# Patient Record
Sex: MALE | Race: WHITE | NOT HISPANIC OR LATINO | Employment: OTHER | URBAN - METROPOLITAN AREA
[De-identification: names, ages, dates, MRNs, and addresses within clinical notes are randomized per-mention and may not be internally consistent; named-entity substitution may affect disease eponyms.]

---

## 2018-11-06 ENCOUNTER — OFFICE VISIT CONVERTED (OUTPATIENT)
Dept: SURGERY | Facility: CLINIC | Age: 73
End: 2018-11-06
Attending: SURGERY

## 2018-12-03 ENCOUNTER — OFFICE VISIT CONVERTED (OUTPATIENT)
Dept: ORTHOPEDIC SURGERY | Facility: CLINIC | Age: 73
End: 2018-12-03
Attending: ORTHOPAEDIC SURGERY

## 2018-12-17 ENCOUNTER — OFFICE VISIT CONVERTED (OUTPATIENT)
Dept: ORTHOPEDIC SURGERY | Facility: CLINIC | Age: 73
End: 2018-12-17
Attending: ORTHOPAEDIC SURGERY

## 2019-12-30 ENCOUNTER — HOSPITAL ENCOUNTER (OUTPATIENT)
Dept: OTHER | Facility: HOSPITAL | Age: 74
Discharge: HOME | End: 2019-12-30
Attending: FAMILY MEDICINE

## 2020-07-17 ENCOUNTER — HOSPITAL ENCOUNTER (OUTPATIENT)
Dept: FAMILY MEDICINE CLINIC | Facility: CLINIC | Age: 75
Discharge: HOME | End: 2020-07-17
Attending: INTERNAL MEDICINE

## 2020-07-17 LAB
C DIFF TOX B STL QL CT TISS CULT: NEGATIVE
CONV 027 TOXIN: NEGATIVE

## 2020-07-20 LAB — BACTERIA SPEC AEROBE CULT: NORMAL

## 2020-07-21 ENCOUNTER — OFFICE VISIT CONVERTED (OUTPATIENT)
Dept: GASTROENTEROLOGY | Facility: CLINIC | Age: 75
End: 2020-07-21
Attending: PHYSICIAN ASSISTANT

## 2020-08-21 ENCOUNTER — HOSPITAL ENCOUNTER (OUTPATIENT)
Dept: PREADMISSION TESTING | Facility: HOSPITAL | Age: 75
Discharge: HOME | End: 2020-08-21
Attending: INTERNAL MEDICINE

## 2020-08-22 LAB — SARS-COV-2 RNA SPEC QL NAA+PROBE: NOT DETECTED

## 2020-08-26 ENCOUNTER — HOSPITAL ENCOUNTER (OUTPATIENT)
Dept: GASTROENTEROLOGY | Facility: HOSPITAL | Age: 75
Setting detail: HOSPITAL OUTPATIENT SURGERY
Discharge: HOME | End: 2020-08-26
Attending: INTERNAL MEDICINE

## 2020-08-26 LAB — GLUCOSE BLD-MCNC: 111 MG/DL (ref 70–99)

## 2020-10-27 ENCOUNTER — OFFICE VISIT CONVERTED (OUTPATIENT)
Dept: GASTROENTEROLOGY | Facility: CLINIC | Age: 75
End: 2020-10-27
Attending: INTERNAL MEDICINE

## 2021-05-13 NOTE — PROGRESS NOTES
Progress Note      Patient Name: Aston Vergara   Patient ID: [id number]   Sex: Male   YOB: 1945    Primary Care Provider: Leon Vergara MD    Visit Date: October 27, 2020    Provider: Meir Carrillo MD   Location: [facility]   Location Address: [address] [place] [address]  [id number]   Location Phone: [phone]          Chief Complaint  · Follow up of Colonoscopy      History Of Present Illness     75-year-old male with a history of left-sided ulcerative colitis diagnosed in 2006.  In the past year he had discontinued all of his sulfasalazine but then started having recurrent symptoms several months ago.  We repeated his colonoscopy about 3 months ago and he had active colitis to about 25 cm confirmed on biopsies but no evidence of dysplasia.  He had restarted the sulfasalazine and currently is taking 1000 mg p.o. twice daily and his symptoms have now once again resolved.  He denies rectal bleeding, diarrhea, abdominal pain, and overall feels much improved.  He has never been exposed immunomodulators or biologic therapies.       Past Medical History  Allergic rhinitis, chronic; Arthritis; Diabetes; High blood pressure; History of knee joint replacement, left; Hypertension; Reflux; Sleep apnea; Ulcerative colitis         Past Surgical History  Artificial Joints/Limbs; Colonoscopy; EGD; Eye Implant; Joint Surgery; Knee surgery         Medication List  amlodipine 5 mg oral tablet; aspirin 81 mg oral tablet,delayed release (DR/EC); atorvastatin 10 mg oral tablet; clonazepam 1 mg oral tablet; diclofenac sodium 50 mg oral tablet,delayed release (DR/EC); glipizide 5 mg oral tablet; metformin 500 mg oral tablet; sulfasalazine 500 mg oral tablet; tamsulosin 0.4 mg oral capsule         Allergy List  NO KNOWN DRUG ALLERGIES         Family Medical History  Diabetes, unspecified type; - No Family History of Colorectal Cancer; Family history of certain chronic disabling diseases;  "arthritis         Social History  Alcohol (Current some day); Caffeine (Current every day); Claustophobic (Unknown); lives with other; Recreational Drug Use (Never); Retired.; Second hand smoke exposure (Never); Single.; Tobacco (Former)         Review of Systems  · Constitutional  o Denies  o : chills, fever  · Cardiovascular  o Denies  o : exertional chest pain  · Respiratory  o Denies  o : shortness of breath  · Gastrointestinal  o Denies  o : nausea, vomiting, dysphagia  · Endocrine  o Denies  o : weight gain, weight loss      Vitals  Date Time BP Position Site L\R Cuff Size HR RR TEMP (F) WT  HT  BMI kg/m2 BSA m2 O2 Sat FR L/min FiO2 HC       10/27/2020 12:50 /71 Sitting    60 - R 12  278lbs 2oz 5'  10\" 39.91 2.5 94 %            Physical Examination  · Constitutional  o Appearance  o : Healthy-appearing, awake and alert in no acute distress  · Head and Face  o Head  o : Normocephalic with no worriesome skin lesions  · Eyes  o Vision  o :   § Visual Fields  § : eyes move symmetrical in all directions  o Sclerae  o : sclerae anicteric  · Neck  o Inspection/Palpation  o : Trachea is midline, no adenopathy  · Respiratory  o Respiratory Effort  o : Breathing is unlabored.  o Inspection of Chest  o : normal appearance  o Auscultation of Lungs  o : Chest is clear to auscultation bilaterally.  · Cardiovascular  o Heart  o :   § Auscultation of Heart  § : no murmurs, rubs, or gallops  o Peripheral Vascular System  o :   § Extremities  § : no cyanosis, clubbing or edema;   · Gastrointestinal  o Abdominal Examination  o : Abdomen is soft, nontender to palpation, with normal active bowel sounds, no appreciable hepatosplenomegaly.              Assessment  · Colitis, Ulcerative     556.9/K51.90      Plan  · Medications  o Medications have been Reconciled  o Transition of Care or Provider Policy  · Instructions  o Overall the patient is much improved after restarting his sulfasalazine 1000 mg p.o. twice daily which we " will continue. He will likely need to continue maintenance therapy indefinitely for his left-sided ulcerative colitis. Because he is doing so well he will follow-up in 1 year or call sooner if needed.            Electronically Signed by: Meir Carrillo MD -Author on November 5, 2020 05:25:24 PM

## 2021-05-13 NOTE — PROGRESS NOTES
Progress Note      Patient Name: Aston Vergara   Patient ID: [id number]   Sex: Male   YOB: 1945    Primary Care Provider: Leon Vergara MD   Referring Provider: Leon Vergara MD    Visit Date: July 21, 2020    Provider: Giancarlo Jeffrey PA-C   Location: [facility]   Location Address: [address] [place] [address]  [id number]   Location Phone: [phone]          Chief Complaint  · Follow-up      History Of Present Illness     75-year-old male with history of ulcerative colitis since 2006 returns after long absence for recent flareup.  He was having loose bloody bowel movements for approximately 2 weeks described as 6 times per day.  He did have negative stool studies.  He resumed his sulfasalazine 1 g twice daily which she had been off of for over a year.  He resumed his sulfasalazine which has completely resolved his symptoms.  He has 1-2 formed bowel movements daily.  He is no longer having bloody stools.  He is only have lower abdominal cramping. Review of the colonoscopy/EGD by Dr. Carrillo 10/2017 showed a small size hiatal hernia, stricture in the GE junction dilated 18 mm, gastritis, and right-sided ulcerative colitis.  He has never been on immunomodulators or biological therapy per his report       Past Medical History  Allergic rhinitis, chronic; Arthritis; Diabetes; High blood pressure; History of knee joint replacement, left; Hypertension; Reflux; Sleep apnea; Ulcerative colitis         Past Surgical History  Artificial Joints/Limbs; Colonoscopy; EGD; Eye Implant; Joint Surgery; Knee surgery         Medication List  aspirin 81 mg oral tablet,delayed release (DR/EC); atorvastatin 10 mg oral tablet; clonazepam 1 mg oral tablet; glipizide 5 mg oral tablet; metformin 500 mg oral tablet; sulfasalazine 500 mg oral tablet; Suprep Bowel Prep Kit 17.5-3.13-1.6 gram oral recon soln; tamsulosin 0.4 mg oral capsule         Allergy List  NO KNOWN DRUG ALLERGIES       Allergies Reconciled  Family  "Medical History  Diabetes, unspecified type; - No Family History of Colorectal Cancer; Family history of certain chronic disabling diseases; arthritis         Social History  Alcohol (Current some day); Caffeine (Current every day); Claustophobic (Unknown); lives with other; Recreational Drug Use (Never); Retired.; Second hand smoke exposure (Never); Single.; Tobacco (Former)         Review of Systems  · Constitutional  o Denies  o : chills, fever  · Cardiovascular  o Denies  o : chest pain  · Respiratory  o Denies  o : shortness of breath  · Gastrointestinal  o Denies  o : nausea, vomiting, dysphagia  · Endocrine  o Denies  o : weight gain, weight loss      Vitals  Date Time BP Position Site L\R Cuff Size HR RR TEMP (F) WT  HT  BMI kg/m2 BSA m2 O2 Sat HC       07/21/2020 12:58 /77 Sitting    59 - R 16  269lbs 0oz 5'  10\" 38.6 2.45 98 %          Physical Examination  · Constitutional  o Appearance  o : Healthy-appearing, awake and alert in no acute distress  · Head and Face  o Head  o : Normocephalic with no worriesome skin lesions  · Eyes  o Vision  o :   § Visual Fields  § : eyes move symmetrical in all directions  o Sclerae  o : sclerae anicteric  · Neck  o Inspection/Palpation  o : Trachea is midline, no adenopathy  · Respiratory  o Respiratory Effort  o : Breathing is unlabored.  o Inspection of Chest  o : normal appearance  o Auscultation of Lungs  o : Chest is clear to auscultation bilaterally.  · Cardiovascular  o Heart  o :   § Auscultation of Heart  § : no murmurs, rubs, or gallops  o Peripheral Vascular System  o :   § Extremities  § : no cyanosis, clubbing or edema;   · Gastrointestinal  o Abdominal Examination  o : Abdomen is soft, nontender to palpation, with normal active bowel sounds, no appreciable hepatosplenomegaly.          Assessment  · Ulcerative Colitis     556.9/K51.90      Plan  · Orders  o Flexible Colonoscopy -Possible risks/complications, benefits, and alternatives to surgical or " invasive procedure have been explained to patient and/or legal gaurdian. -Patient has been evaluated and can tolerate anethesia and/or sedation. Risk, benefits, and alternatives to anethesia and/or sedation have been explained to patient and/or legal gaurdian. ([id number]) - 556.9/K51.90 - 07/21/2020  · Medications  o Medications have been Reconciled  o Transition of Care or Provider Policy  · Instructions  o 75-year-old male with a recent flare of ulcerative colitis is also due for a colonoscopy. I will schedule him for a colonoscopy to evaluate the extent of ulcerative colitis. He has never been on immunomodulator biological therapy. He will continue sulfasalazine 1 g twice daily. He will call for any worsening symptoms and I will consider a prednisone taper.            Electronically Signed by: Giancarlo Jeffrey PA-C -Author on July 21, 2020 01:15:12 PM  Electronically Co-signed by: Meir Carrillo MD -Reviewer on July 22, 2020 05:27:59 PM

## 2021-05-14 VITALS
OXYGEN SATURATION: 94 % | SYSTOLIC BLOOD PRESSURE: 152 MMHG | WEIGHT: 278.12 LBS | BODY MASS INDEX: 39.82 KG/M2 | DIASTOLIC BLOOD PRESSURE: 71 MMHG | HEIGHT: 70 IN | RESPIRATION RATE: 12 BRPM | HEART RATE: 60 BPM

## 2021-05-15 VITALS
WEIGHT: 269 LBS | DIASTOLIC BLOOD PRESSURE: 77 MMHG | RESPIRATION RATE: 16 BRPM | BODY MASS INDEX: 38.51 KG/M2 | OXYGEN SATURATION: 98 % | HEIGHT: 70 IN | HEART RATE: 59 BPM | SYSTOLIC BLOOD PRESSURE: 153 MMHG

## 2021-05-16 VITALS — OXYGEN SATURATION: 96 % | BODY MASS INDEX: 37.65 KG/M2 | HEART RATE: 62 BPM | WEIGHT: 263 LBS | HEIGHT: 70 IN

## 2021-05-16 VITALS — WEIGHT: 262.12 LBS | HEIGHT: 70 IN | RESPIRATION RATE: 16 BRPM | BODY MASS INDEX: 37.53 KG/M2

## 2021-05-16 VITALS — HEART RATE: 76 BPM | BODY MASS INDEX: 37.51 KG/M2 | WEIGHT: 262 LBS | OXYGEN SATURATION: 97 % | HEIGHT: 70 IN

## 2021-10-28 RX ORDER — ATORVASTATIN CALCIUM 10 MG/1
TABLET, FILM COATED ORAL
COMMUNITY

## 2021-10-28 RX ORDER — AMLODIPINE BESYLATE 5 MG/1
TABLET ORAL
COMMUNITY

## 2021-10-28 RX ORDER — ASPIRIN 81 MG/1
TABLET ORAL
COMMUNITY

## 2021-10-28 RX ORDER — CLONAZEPAM 1 MG/1
TABLET ORAL
COMMUNITY

## 2021-10-28 RX ORDER — ALLOPURINOL 300 MG/1
TABLET ORAL
COMMUNITY

## 2021-10-28 RX ORDER — TAMSULOSIN HYDROCHLORIDE 0.4 MG/1
CAPSULE ORAL
COMMUNITY

## 2021-10-28 RX ORDER — GLIPIZIDE 5 MG/1
TABLET ORAL
COMMUNITY

## 2021-11-02 ENCOUNTER — OFFICE VISIT (OUTPATIENT)
Dept: GASTROENTEROLOGY | Facility: CLINIC | Age: 76
End: 2021-11-02

## 2021-11-02 VITALS
WEIGHT: 289 LBS | BODY MASS INDEX: 41.47 KG/M2 | DIASTOLIC BLOOD PRESSURE: 82 MMHG | SYSTOLIC BLOOD PRESSURE: 180 MMHG | HEART RATE: 64 BPM | OXYGEN SATURATION: 95 %

## 2021-11-02 DIAGNOSIS — K62.5 RECTAL BLEEDING: ICD-10-CM

## 2021-11-02 DIAGNOSIS — K51.30 ULCERATIVE RECTOSIGMOIDITIS WITHOUT COMPLICATION (HCC): Primary | ICD-10-CM

## 2021-11-02 PROCEDURE — 99213 OFFICE O/P EST LOW 20 MIN: CPT | Performed by: INTERNAL MEDICINE

## 2021-11-02 RX ORDER — SULFASALAZINE 500 MG/1
1000 TABLET ORAL 2 TIMES DAILY
Qty: 120 TABLET | Refills: 11 | Status: SHIPPED | OUTPATIENT
Start: 2021-11-02 | End: 2021-12-02

## 2021-11-02 RX ORDER — SULFASALAZINE 500 MG/1
1000 TABLET ORAL 2 TIMES DAILY
COMMUNITY
Start: 2021-10-21 | End: 2021-11-02 | Stop reason: SDUPTHER

## 2021-11-02 NOTE — PROGRESS NOTES
"Chief Complaint    Aston Vergara is a 76 y.o. male who presents to [facility] for follow-up of left-sided ulcerative colitis to 25 cm.  His last colonoscopy was done in August 2020 and showed colitis and therefore the patient has been maintained on sulfasalazine 1000 mg / 2 tablets twice daily.  Currently is doing well denies any bleeding, diarrhea, abdominal pain, nausea vomiting.  He actually has occasional hard stools and has to use a stool softener.    Result Review :     The following data was reviewed by: Meir Carrillo MD on 11/02/2021:              Data reviewed: GI studies Colonoscopy August 2020 reviewed     Past Medical History:   Diagnosis Date   • Acid reflux    • Allergic rhinitis    • Arthritis    • Diabetes (HCC)    • High blood pressure    • History of knee joint replacement 12/18/2018    right   • Hypertension    • Sleep apnea    • Ulcerative colitis (HCC)        Past Surgical History:   Procedure Laterality Date   • COLONOSCOPY  2020   • ENDOSCOPY  2017   • INTRAOCULAR LENS INSERTION     • JOINT REPLACEMENT     • KNEE SURGERY      both knee replaced   • OTHER SURGICAL HISTORY      artificial joints/limbs       Social History     Social History Narrative   • Not on file       Objective     Vital Signs:   /82   Pulse 64   Wt 131 kg (289 lb)   SpO2 95%   BMI 41.47 kg/m²     Body mass index is 41.47 kg/m².    Physical Exam            Assessment and Plan    Diagnoses and all orders for this visit:    1. 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 Rectal bleeding    The patient continues on sulfasalazine 1000 mg p.o. twice daily and I will refill this medication.  He previously discontinued that therapy and had recurrent symptoms.  We will plan repeat colonoscopy in August 2023 or sooner if needed.  He will see me annually unless he develops any recurrent symptoms..  Patient is now known as \"pumpkin\".              Follow Up "   Return in about 1 year (around 11/2/2022).  Patient was given instructions and counseling regarding his condition or for health maintenance advice. Please see specific information pulled into the AVS if appropriate.

## 2021-12-21 RX ORDER — SULFASALAZINE 500 MG/1
TABLET ORAL
Qty: 120 TABLET | Refills: 5 | Status: SHIPPED | OUTPATIENT
Start: 2021-12-21 | End: 2022-06-20

## 2022-06-20 RX ORDER — SULFASALAZINE 500 MG/1
TABLET ORAL
Qty: 120 TABLET | Refills: 5 | Status: SHIPPED | OUTPATIENT
Start: 2022-06-20 | End: 2022-07-20

## 2022-11-08 ENCOUNTER — OFFICE VISIT (OUTPATIENT)
Dept: GASTROENTEROLOGY | Facility: CLINIC | Age: 77
End: 2022-11-08

## 2022-11-08 VITALS
WEIGHT: 282 LBS | SYSTOLIC BLOOD PRESSURE: 138 MMHG | DIASTOLIC BLOOD PRESSURE: 78 MMHG | HEART RATE: 77 BPM | BODY MASS INDEX: 40.37 KG/M2 | OXYGEN SATURATION: 94 % | HEIGHT: 70 IN

## 2022-11-08 DIAGNOSIS — K62.5 RECTAL BLEEDING: ICD-10-CM

## 2022-11-08 DIAGNOSIS — K51.30 ULCERATIVE RECTOSIGMOIDITIS WITHOUT COMPLICATION: Primary | ICD-10-CM

## 2022-11-08 PROCEDURE — 99213 OFFICE O/P EST LOW 20 MIN: CPT | Performed by: INTERNAL MEDICINE

## 2022-11-08 RX ORDER — LOSARTAN POTASSIUM AND HYDROCHLOROTHIAZIDE 12.5; 1 MG/1; MG/1
TABLET ORAL TAKE AS DIRECTED
COMMUNITY
Start: 2022-11-07

## 2022-11-08 RX ORDER — SULFASALAZINE 500 MG/1
1000 TABLET ORAL 2 TIMES DAILY
COMMUNITY
End: 2022-12-12

## 2022-11-08 NOTE — PROGRESS NOTES
"Chief Complaint    Aston Vergara is a 77 y.o. male who presents to [facility] for follow-up of rectal bleeding and ulcerative colitis of the left colon.  He currently is on sulfasalazine 1000 mg p.o. twice daily and has done well over the past year.  He denies any ongoing bleeding, diarrhea, abdominal pain, or any other GI complaints.  His last colonoscopy was August 2020 and showed colitis to about 25 cm    Result Review :     The following data was reviewed by: Meir Carrillo MD on 11/08/2022:              Data reviewed: Radiologic studies Reviewed     Past Medical History:   Diagnosis Date   • Acid reflux    • Allergic rhinitis    • Arthritis    • Diabetes (HCC)    • High blood pressure    • History of knee joint replacement 12/18/2018    right   • Hypertension    • Sleep apnea    • Ulcerative colitis (HCC)        Past Surgical History:   Procedure Laterality Date   • COLONOSCOPY  2020   • ENDOSCOPY  2017   • INTRAOCULAR LENS INSERTION     • JOINT REPLACEMENT     • KNEE SURGERY      both knee replaced   • OTHER SURGICAL HISTORY      artificial joints/limbs       Social History     Social History Narrative   • Not on file       Objective     Vital Signs:   /78 (BP Location: Right arm, Patient Position: Sitting, Cuff Size: Adult)   Pulse 77   Ht 177.8 cm (70\")   Wt 128 kg (282 lb)   SpO2 94%   BMI 40.46 kg/m²     Body mass index is 40.46 kg/m².    Physical Exam            Assessment and Plan    Diagnoses and all orders for this visit:    1. 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 Rectal bleeding      Overall the patient is doing well and will continue sulfasalazine 1000 mg p.o. twice daily.  He will follow-up with me in August 2023 and at that time be scheduled for repeat colonoscopy.  He goes by the name pumpkin and is from [place]            Follow Up   No follow-ups on file.  Patient was given instructions and counseling " regarding his condition or for health maintenance advice. Please see specific information pulled into the AVS if appropriate.

## 2022-12-12 RX ORDER — SULFASALAZINE 500 MG/1
TABLET ORAL
Qty: 120 TABLET | Refills: 5 | Status: SHIPPED | OUTPATIENT
Start: 2022-12-12

## 2023-04-25 ENCOUNTER — TELEPHONE (OUTPATIENT)
Dept: GASTROENTEROLOGY | Facility: CLINIC | Age: 78
End: 2023-04-25
Payer: MEDICARE

## 2023-04-25 NOTE — TELEPHONE ENCOUNTER
I spoke with Mr Vergara, informed him Dr Carrillo will not be available on 06.13.23 at 1:45pm. Offered to reschedule him with our NP's.  Pt agreed, new appt is 06.14.23 at 1:15pm. Voiced understanding. [email or web address]

## 2023-06-12 RX ORDER — SULFASALAZINE 500 MG/1
TABLET ORAL
Qty: 120 TABLET | Refills: 5 | Status: SHIPPED | OUTPATIENT
Start: 2023-06-12 | End: 2023-06-14 | Stop reason: SDUPTHER

## 2023-06-12 NOTE — PROGRESS NOTES
Chief Complaint   UC/Due for colonoscopy    History of Present Illness       Aston Vergara is a 77 y.o. male who presents to [facility] for follow-up of rectal bleeding and ulcerative colitis of the left colon.  He currently is on sulfasalazine 1000 mg p.o. twice daily and has done well over the past year.  He denies any ongoing bleeding, diarrhea, abdominal pain, or any other GI complaints.  His last colonoscopy was August 2020 and showed colitis to about 25 cm     Colonoscopy: Review of the patient's most recent colonoscopy performed by Dr. Carrillo on 08.26.2020 patchy discontinuous granularity and erythema with contact bleeding noted in the whole colon predominantly affecting the rectum extending to 25 cm compatible with moderate ulcerative colitis of the left colon with more questant disease seen in the right and transverse colon.  Grade 1 internal hemorrhoids.    EGD: Review of the patient's most recent EGD performed by Dr. Carrillo on 10.19.2017 small hiatal hernia, diffuse continuous erythema of the mucosa in the stomach patchy discontinuous erythema friable and ulcer with contact bleeding were noted in the ascending colon, hepatic flexure and transverse colon        Results       Result Review :   The following data was reviewed by: WILLIE Hummel on 06/14/2023:                      Past Medical History       Past Medical History:   Diagnosis Date    Acid reflux     Allergic rhinitis     Arthritis     Diabetes     High blood pressure     History of knee joint replacement 12/18/2018    right    Hypertension     Sleep apnea     Ulcerative colitis        Past Surgical History:   Procedure Laterality Date    COLONOSCOPY  2020    ENDOSCOPY  2017    INTRAOCULAR LENS INSERTION      JOINT REPLACEMENT      KNEE SURGERY      both knee replaced    OTHER SURGICAL HISTORY      artificial joints/limbs         Current Outpatient Medications:     amLODIPine (NORVASC) 10 MG tablet, Take 1  tablet by mouth., Disp: , Rfl:     aspirin 81 MG EC tablet, aspirin 81 mg oral tablet,delayed release (DR/EC) take 1 tablet (81 mg) by oral route once daily   Active, Disp: , Rfl:     atorvastatin (LIPITOR) 10 MG tablet, atorvastatin 10 mg oral tablet take 1 tablet (10 mg) by oral route once daily at bedtime   Active, Disp: , Rfl:     clonazePAM (KlonoPIN) 2 MG tablet, Take 1 tablet by mouth every night at bedtime., Disp: , Rfl:     diclofenac (VOLTAREN) 50 MG EC tablet, diclofenac sodium 50 mg oral tablet,delayed release (DR/EC) take 1 tablet (50 mg) by oral route 2 times per day   Active, Disp: , Rfl:     dutasteride (AVODART) 0.5 MG capsule, Take 1 capsule by mouth Daily., Disp: , Rfl:     fluticasone (FLONASE) 50 MCG/ACT nasal spray, USE 2 SPRAYS IN EACHN NOSTRIL TWICE DAILY, Disp: , Rfl:     glipizide (GLUCOTROL) 5 MG tablet, glipizide 5 mg oral tablet take 1 tablet (5 mg) by oral route once daily before a meal   Active, Disp: , Rfl:     losartan-hydrochlorothiazide (HYZAAR) 100-12.5 MG per tablet, Take As Directed., Disp: , Rfl:     metFORMIN (GLUCOPHAGE) 500 MG tablet, metformin 500 mg oral tablet take 1 tablet (500 mg) by oral route 2 times per day with morning and evening meals   Active, Disp: , Rfl:     sulfaSALAzine (AZULFIDINE) 500 MG tablet, Take 2 tablets by mouth 2 (Two) Times a Day., Disp: 120 tablet, Rfl: 5    tamsulosin (FLOMAX) 0.4 MG capsule 24 hr capsule, tamsulosin 0.4 mg oral capsule take 1 capsule (0.4 mg) by oral route once daily 1/2 hour following the same meal each day   Active, Disp: , Rfl:     TART CHERRY PO, Take  by mouth Daily As Needed., Disp: , Rfl:     allopurinol (ZYLOPRIM) 300 MG tablet, allopurinol 300 mg oral tablet take 1 tablet (300 mg) by oral route once daily for 90 days   Active (Patient not taking: Reported on 6/14/2023), Disp: , Rfl:      Allergies   Allergen Reactions    Colchicine Nausea Only     And diarrhea       Family History   Problem Relation Age of Onset     "Diabetes Mother     Arthritis Mother     Arthritis Father     Colon cancer Neg Hx         Social History     Social History Narrative    Not on file       Objective   Vital Signs:   /57 (BP Location: Right arm, Patient Position: Sitting, Cuff Size: Large Adult)   Pulse 83   Ht 177.8 cm (70\")   Wt 130 kg (286 lb)   SpO2 91%   BMI 41.04 kg/m²       Physical Exam  Constitutional:       General: He is not in acute distress.     Appearance: Normal appearance. He is well-developed and normal weight.   Eyes:      Conjunctiva/sclera: Conjunctivae normal.      Pupils: Pupils are equal, round, and reactive to light.      Visual Fields: Right eye visual fields normal and left eye visual fields normal.   Cardiovascular:      Rate and Rhythm: Normal rate and regular rhythm.      Heart sounds: Normal heart sounds.   Pulmonary:      Effort: Pulmonary effort is normal. No retractions.      Breath sounds: Normal breath sounds and air entry.      Comments: Inspection of chest: normal appearance  Abdominal:      General: Bowel sounds are normal.      Palpations: Abdomen is soft.      Tenderness: There is no abdominal tenderness.      Comments: No appreciable hepatosplenomegaly   Musculoskeletal:      Cervical back: Neck supple.      Right lower leg: No edema.      Left lower leg: No edema.   Lymphadenopathy:      Cervical: No cervical adenopathy.   Skin:     Findings: No lesion.      Comments: Turgor normal   Neurological:      Mental Status: He is alert and oriented to person, place, and time.   Psychiatric:         Mood and Affect: Mood and affect normal.         Assessment & Plan          Assessment and Plan    Diagnoses and all orders for this visit:    1. Ulcerative rectosigmoiditis without complication (Primary)    Other orders  -     sulfaSALAzine (AZULFIDINE) 500 MG tablet; Take 2 tablets by mouth 2 (Two) Times a Day.  Dispense: 120 tablet; Refill: 5        Overall the patient is doing well and will continue " sulfasalazine 1000 mg p.o. twice daily.   I have recommended that the patient undergo further evaluation with a colonoscopy.  I have discussed this procedure in detail with the patient.  I have discussed the risks, benefits and alternatives.  I have discussed the risk of anesthesia, bleeding and perforation.  Patient understands these risks, benefits and alternatives and wishes to defer at this time due to his age.  Per Dr. Carrillo's last note he goes by the name pumpkin and is from [place].  Patient had recent lab work performed with primary care we will retrieve these records.  Patient will follow-up in the office in 6 months.  Patient agreeable to this plan will call with any questions or concerns.        Follow Up       Follow Up   Return in about 6 months (around 12/14/2023).  Patient was given instructions and counseling regarding his condition or for health maintenance advice. Please see specific information pulled into the AVS if appropriate.

## 2023-06-14 ENCOUNTER — OFFICE VISIT (OUTPATIENT)
Dept: GASTROENTEROLOGY | Facility: CLINIC | Age: 78
End: 2023-06-14
Payer: MEDICARE

## 2023-06-14 VITALS
HEART RATE: 83 BPM | BODY MASS INDEX: 40.94 KG/M2 | OXYGEN SATURATION: 91 % | DIASTOLIC BLOOD PRESSURE: 57 MMHG | SYSTOLIC BLOOD PRESSURE: 129 MMHG | HEIGHT: 70 IN | WEIGHT: 286 LBS

## 2023-06-14 DIAGNOSIS — K51.30 ULCERATIVE RECTOSIGMOIDITIS WITHOUT COMPLICATION: Primary | ICD-10-CM

## 2023-06-14 RX ORDER — AMLODIPINE BESYLATE 10 MG/1
10 TABLET ORAL
COMMUNITY
Start: 2023-04-10

## 2023-06-14 RX ORDER — CLONAZEPAM 2 MG/1
2 TABLET ORAL
COMMUNITY

## 2023-06-14 RX ORDER — DUTASTERIDE 0.5 MG/1
0.5 CAPSULE, LIQUID FILLED ORAL DAILY
COMMUNITY

## 2023-06-14 RX ORDER — FLUTICASONE PROPIONATE 50 MCG
SPRAY, SUSPENSION (ML) NASAL
COMMUNITY

## 2023-06-14 RX ORDER — SULFASALAZINE 500 MG/1
1000 TABLET ORAL 2 TIMES DAILY
Qty: 120 TABLET | Refills: 5 | Status: SHIPPED | OUTPATIENT
Start: 2023-06-14

## 2023-06-14 NOTE — PATIENT INSTRUCTIONS
Ulcerative Colitis, Adult  Ulcerative colitis is long-term (chronic) inflammation of the large intestine (colon) and rectum. Sores (ulcers) may also form in these areas.  Ulcerative colitis, along with a closely related condition called Crohn's disease, is often referred to as inflammatory bowel disease.  What are the causes?  This condition may be caused by increased activity of the immune system in the intestines. The immune system is the system that protects the body against harmful bacteria, viruses, fungi, and other things that can make you sick. The cause of the increased activity of the immune system is not known.  What increases the risk?  The following factors may make you more likely to develop this condition:  Being under 30 years old.  Having a family history of ulcerative colitis.  What are the signs or symptoms?  Symptoms vary depending on how severe the condition is. Common symptoms include:  Rectal bleeding.  Diarrhea, often with blood or pus in the stool.  Other symptoms can include:  Pain or cramping in the abdomen.  Fever.  Tiredness (fatigue).  Nausea, loss of appetite, or weight loss.  Rectal pain.  A strong and sudden need to have a bowel movement (bowel urgency).  Anemia.  Yellowing of the skin (jaundice) from liver dysfunction or skin rashes.  Symptoms can range from mild to severe. They may come and go.  How is this diagnosed?  This condition may be diagnosed based on:  Your symptoms and medical history.  A physical exam.  Tests, including:  Blood tests and stool tests.  X-rays.  CT scan.  MRI.  Colonoscopy. For this test, a flexible tube is inserted into your anus, and your colon is examined.  Biopsy. In this test, a tissue sample is taken from your colon and examined under a microscope.  How is this treated?  There is no cure for this condition, but it can be managed. Treatment depends on the severity of the disease. Treatment for this condition may include medicines to:  Decrease swelling  and inflammation.  Control your immune system.  Treat infections.  Relieve pain.  Control diarrhea.  Severe flare-ups may need to be treated at a hospital. Treatment in a hospital may involve:  Resting the bowel. This involves not eating or drinking for a period of time.  Getting medicines through an IV.  Getting fluids and nutrition through:  An IV.  A tube that is passed through the nose and into the stomach (nasogastric or NG tube).  Surgery to remove the affected part of the colon. This may be done if other treatments are not helping.  This condition increases the risk of colon cancer. Adults with this condition will need to be checked for colon cancer throughout life.  Follow these instructions at home:  Medicines and vitamins  Take over-the-counter and prescription medicines only as told by your health care provider. Do not take aspirin.  If you were prescribed an antibiotic medicine, take it as told by your health care provider. Do not stop taking the antibiotic even if you start to feel better.  Ask your health care provider if you should take any vitamins or supplements. You may need to take:  Calcium and vitamin D for bone health.  Iron to help treat anemia.  Lifestyle  Exercise regularly.  Work with your health care provider to manage your condition and educate yourself about your condition.  Do not use any products that contain nicotine or tobacco. These products include cigarettes, chewing tobacco, and vaping devices, such as e-cigarettes. If you need help quitting, ask your health care provider.  If you drink alcohol:  Limit how much you have to:  0-1 drink a day for women who are not pregnant.  0-2 drinks a day for men.  Know how much alcohol is in a drink. In the [place] one drink equals one 12 oz bottle of beer (355 mL), one 5 oz glass of wine (148 mL), or one 1½ oz glass of hard liquor (44 mL).  Eating and drinking  Keep a food diary. This may help you identify and avoid any foods that trigger your  symptoms.  Drink enough fluid to keep your urine pale yellow.  Follow a well-balanced diet as told by your health care provider. This may include:  Avoiding carbonated drinks.  Avoiding popcorn, vegetable skins, nuts, and other high-fiber foods.  Avoiding high-fat foods.  Eating smaller meals, but more often.  Limiting sugary drinks.  Limiting caffeine.  Follow food safety recommendations as told by your health care provider. This may include making sure you:  Avoid eating raw or undercooked meat, fish, or eggs.  Do not eat or drink spoiled or  foods and drinks.  General instructions  Wash your hands often with soap and water for at least 20 seconds. If soap and water are not available, use hand .  Stay up to date on your vaccinations, including a yearly (annual) flu shot. Ask your health care provider which vaccines you should get.  Have cancer screening tests as told by your health care provider. Ulcerative colitis may place you at increased risk for colon cancer.  Keep all follow-up visits. This is important.  Where to find more information  You can find some helpful and educational information about ulcerative colitis at the [employer] [place] [employer] and [employer] online here: www.niddk.nih.gov  Contact a health care provider if:  Your symptoms do not improve or they get worse with treatment.  You continue to lose weight.  You have constant cramps or loose stools.  You develop a new skin rash, skin sores, or eye problems.  You have a fever or chills.  Get help right away if:  You have bloody diarrhea.  You have severe bleeding from the rectum.  You feel that your heart is racing.  You have severe pain in your abdomen.  Your abdomen swells (abdominal distension).  Your abdomen is tender to the touch.  You vomit.  Summary  Ulcerative colitis is long-lasting (chronic) inflammation of the large intestine (colon) and rectum. Sores (ulcers) may also form in these areas.  Follow  instructions from your health care provider about medicines, lifestyle changes, and eating and drinking.  Contact your health care provider if symptoms do not improve or they get worse with treatment.  Get help right away if you have severe abdominal pain, abdominal swelling, or severe bleeding from the rectum.  Keep all follow-up visits. This is important.  This information is not intended to replace advice given to you by your health care provider. Make sure you discuss any questions you have with your health care provider.  Document Revised: 08/24/2021 Document Reviewed: 08/24/2021  Elsevier Patient Education © 2022 [employer]

## 2023-12-11 RX ORDER — SULFASALAZINE 500 MG/1
1000 TABLET ORAL 2 TIMES DAILY
Qty: 120 TABLET | Refills: 5 | Status: SHIPPED | OUTPATIENT
Start: 2023-12-11 | End: 2023-12-12 | Stop reason: SDUPTHER

## 2023-12-11 NOTE — TELEPHONE ENCOUNTER
Received fax from [facility] pharmacy.  Sulfasalazine 500mg 2 tabs po BID.  alejandro

## 2023-12-12 ENCOUNTER — OFFICE VISIT (OUTPATIENT)
Dept: GASTROENTEROLOGY | Facility: CLINIC | Age: 78
End: 2023-12-12
Payer: MEDICARE

## 2023-12-12 VITALS
DIASTOLIC BLOOD PRESSURE: 73 MMHG | OXYGEN SATURATION: 94 % | SYSTOLIC BLOOD PRESSURE: 143 MMHG | HEIGHT: 70 IN | HEART RATE: 70 BPM | WEIGHT: 291 LBS | BODY MASS INDEX: 41.66 KG/M2

## 2023-12-12 DIAGNOSIS — K51.30 ULCERATIVE RECTOSIGMOIDITIS WITHOUT COMPLICATION: Primary | ICD-10-CM

## 2023-12-12 DIAGNOSIS — K62.5 RECTAL BLEEDING: ICD-10-CM

## 2023-12-12 RX ORDER — SULFASALAZINE 500 MG/1
1000 TABLET ORAL 2 TIMES DAILY
Qty: 120 TABLET | Refills: 5 | Status: SHIPPED | OUTPATIENT
Start: 2023-12-12

## 2023-12-12 NOTE — PROGRESS NOTES
"Chief Complaint    Aston Vergara is a 78 y.o. male who presents to [facility] for follow-up of his left-sided ulcerative colitis.  His last colonoscopy was August 2020 showing a moderate colitis to the left colon at about 25 cm.  He has been maintained on sulfasalazine 2 pills p.o. twice daily with good control of his symptoms. showing moderate colitis up to 25 cm.  He denies rectal bleeding, abdominal pain diarrhea, weight loss, or any other GI complaints.  His last colonoscopy was reviewed from August 2020    .  Result Review :{Labs     The following data was reviewed by: Meir Carrillo MD on 12/12/2023:        Data reviewed : GI studies reviewed      Past Medical History:   Diagnosis Date    Acid reflux     Allergic rhinitis     Arthritis     Diabetes     High blood pressure     History of knee joint replacement 12/18/2018    right    Hypertension     Sleep apnea     Ulcerative colitis        Past Surgical History:   Procedure Laterality Date    COLONOSCOPY  2020    ENDOSCOPY  2017    INTRAOCULAR LENS INSERTION      JOINT REPLACEMENT      KNEE SURGERY      both knee replaced    OTHER SURGICAL HISTORY      artificial joints/limbs       Social History     Social History Narrative    Not on file       Objective     Vital Signs:   /73 (BP Location: Right arm, Patient Position: Sitting, Cuff Size: Adult)   Pulse 70   Ht 177.8 cm (70\")   Wt 132 kg (291 lb)   SpO2 94%   BMI 41.75 kg/m²     Body mass index is 41.75 kg/m².    Physical Exam            Assessment and Plan    Diagnoses and all orders for this visit:    1. Ulcerative rectosigmoiditis without complication (Primary)    2. Rectal bleeding    Other orders  -     sulfaSALAzine (AZULFIDINE) 500 MG tablet; Take 2 tablets by mouth 2 (Two) Times a Day.  Dispense: 120 tablet; Refill: 5    Patient doing quite well on the current regimen above.  We had a long conversation regarding pursuing a repeat colonoscopy and " the patient currently elects to defer repeat colonoscopy given his age which I think is reasonable.  Certainly if he develops any symptoms he we will reconsider.  Otherwise he will follow-up with us in 1 year.  I refilled his sulfasalazine 2 pills p.o. twice daily for him to continue.              Follow Up   No follow-ups on file.  Patient was given instructions and counseling regarding his condition or for health maintenance advice. Please see specific information pulled into the AVS if appropriate.

## 2024-01-18 ENCOUNTER — APPOINTMENT (OUTPATIENT)
Dept: GENERAL RADIOLOGY | Facility: HOSPITAL | Age: 79
End: 2024-01-18
Payer: MEDICARE

## 2024-01-18 ENCOUNTER — HOSPITAL ENCOUNTER (INPATIENT)
Facility: HOSPITAL | Age: 79
LOS: 1 days | Discharge: HOME | End: 2024-01-18
Attending: EMERGENCY MEDICINE | Admitting: FAMILY MEDICINE
Payer: MEDICARE

## 2024-01-18 ENCOUNTER — READMISSION MANAGEMENT (OUTPATIENT)
Dept: CALL CENTER | Facility: HOSPITAL | Age: 79
End: 2024-01-18
Payer: MEDICARE

## 2024-01-18 VITALS
HEIGHT: 70 IN | OXYGEN SATURATION: 94 % | BODY MASS INDEX: 40.75 KG/M2 | SYSTOLIC BLOOD PRESSURE: 140 MMHG | WEIGHT: 284.61 LBS | RESPIRATION RATE: 22 BRPM | HEART RATE: 94 BPM | TEMPERATURE: 98.1 F | DIASTOLIC BLOOD PRESSURE: 68 MMHG

## 2024-01-18 DIAGNOSIS — R53.1 WEAKNESS GENERALIZED: ICD-10-CM

## 2024-01-18 DIAGNOSIS — U07.1 COVID-19: Primary | ICD-10-CM

## 2024-01-18 DIAGNOSIS — J96.01 ACUTE RESPIRATORY FAILURE WITH HYPOXIA: ICD-10-CM

## 2024-01-18 PROBLEM — I10 ESSENTIAL HYPERTENSION: Status: ACTIVE | Noted: 2024-01-18

## 2024-01-18 PROBLEM — J96.20 ACUTE-ON-CHRONIC RESPIRATORY FAILURE: Status: ACTIVE | Noted: 2024-01-18

## 2024-01-18 PROBLEM — J12.82 PNEUMONIA DUE TO COVID-19 VIRUS: Status: ACTIVE | Noted: 2024-01-18

## 2024-01-18 PROBLEM — E11.21 DIABETES MELLITUS WITH NEPHROPATHY: Status: ACTIVE | Noted: 2024-01-18

## 2024-01-18 PROBLEM — G47.33 OSA (OBSTRUCTIVE SLEEP APNEA): Status: ACTIVE | Noted: 2024-01-18

## 2024-01-18 LAB
ALBUMIN SERPL-MCNC: 4.5 G/DL (ref 3.5–5.2)
ALBUMIN/GLOB SERPL: 1.4 G/DL
ALP SERPL-CCNC: 75 U/L (ref 39–117)
ALT SERPL W P-5'-P-CCNC: 28 U/L (ref 1–41)
ANION GAP SERPL CALCULATED.3IONS-SCNC: 14.5 MMOL/L (ref 5–15)
AST SERPL-CCNC: 20 U/L (ref 1–40)
BACTERIA UR QL AUTO: ABNORMAL /HPF
BASOPHILS # BLD AUTO: 0.03 10*3/MM3 (ref 0–0.2)
BASOPHILS NFR BLD AUTO: 0.5 % (ref 0–1.5)
BILIRUB SERPL-MCNC: 0.3 MG/DL (ref 0–1.2)
BILIRUB UR QL STRIP: NEGATIVE
BUN SERPL-MCNC: 18 MG/DL (ref 8–23)
BUN/CREAT SERPL: 17.6 (ref 7–25)
CALCIUM SPEC-SCNC: 9.5 MG/DL (ref 8.6–10.5)
CHLORIDE SERPL-SCNC: 97 MMOL/L (ref 98–107)
CLARITY UR: CLEAR
CO2 SERPL-SCNC: 24.5 MMOL/L (ref 22–29)
COLOR UR: YELLOW
CREAT SERPL-MCNC: 1.02 MG/DL (ref 0.76–1.27)
CRP SERPL-MCNC: 5.36 MG/DL (ref 0–0.5)
D-LACTATE SERPL-SCNC: 1.4 MMOL/L (ref 0.5–2)
DEPRECATED RDW RBC AUTO: 47.6 FL (ref 37–54)
EGFRCR SERPLBLD CKD-EPI 2021: 74.8 ML/MIN/1.73
EOSINOPHIL # BLD AUTO: 0.03 10*3/MM3 (ref 0–0.4)
EOSINOPHIL NFR BLD AUTO: 0.5 % (ref 0.3–6.2)
ERYTHROCYTE [DISTWIDTH] IN BLOOD BY AUTOMATED COUNT: 14.2 % (ref 12.3–15.4)
FLUAV SUBTYP SPEC NAA+PROBE: NOT DETECTED
FLUBV RNA ISLT QL NAA+PROBE: NOT DETECTED
GLOBULIN UR ELPH-MCNC: 3.3 GM/DL
GLUCOSE BLDC GLUCOMTR-MCNC: 207 MG/DL (ref 70–99)
GLUCOSE BLDC GLUCOMTR-MCNC: 351 MG/DL (ref 70–99)
GLUCOSE SERPL-MCNC: 157 MG/DL (ref 65–99)
GLUCOSE UR STRIP-MCNC: NEGATIVE MG/DL
HCT VFR BLD AUTO: 42.6 % (ref 37.5–51)
HGB BLD-MCNC: 14.4 G/DL (ref 13–17.7)
HGB UR QL STRIP.AUTO: ABNORMAL
HOLD SPECIMEN: NORMAL
HOLD SPECIMEN: NORMAL
HYALINE CASTS UR QL AUTO: ABNORMAL /LPF
IMM GRANULOCYTES # BLD AUTO: 0.02 10*3/MM3 (ref 0–0.05)
IMM GRANULOCYTES NFR BLD AUTO: 0.4 % (ref 0–0.5)
KETONES UR QL STRIP: NEGATIVE
LEUKOCYTE ESTERASE UR QL STRIP.AUTO: NEGATIVE
LYMPHOCYTES # BLD AUTO: 0.59 10*3/MM3 (ref 0.7–3.1)
LYMPHOCYTES NFR BLD AUTO: 10.5 % (ref 19.6–45.3)
MAGNESIUM SERPL-MCNC: 1.7 MG/DL (ref 1.6–2.4)
MCH RBC QN AUTO: 30.8 PG (ref 26.6–33)
MCHC RBC AUTO-ENTMCNC: 33.8 G/DL (ref 31.5–35.7)
MCV RBC AUTO: 91 FL (ref 79–97)
MONOCYTES # BLD AUTO: 0.83 10*3/MM3 (ref 0.1–0.9)
MONOCYTES NFR BLD AUTO: 14.7 % (ref 5–12)
NEUTROPHILS NFR BLD AUTO: 4.14 10*3/MM3 (ref 1.7–7)
NEUTROPHILS NFR BLD AUTO: 73.4 % (ref 42.7–76)
NITRITE UR QL STRIP: NEGATIVE
NRBC BLD AUTO-RTO: 0 /100 WBC (ref 0–0.2)
NT-PROBNP SERPL-MCNC: 575.2 PG/ML (ref 0–1800)
PH UR STRIP.AUTO: 7.5 [PH] (ref 5–8)
PLATELET # BLD AUTO: 147 10*3/MM3 (ref 140–450)
PMV BLD AUTO: 10 FL (ref 6–12)
POTASSIUM SERPL-SCNC: 4.1 MMOL/L (ref 3.5–5.2)
PROT SERPL-MCNC: 7.8 G/DL (ref 6–8.5)
PROT UR QL STRIP: ABNORMAL
QT INTERVAL: 337 MS
QTC INTERVAL: 499 MS
RBC # BLD AUTO: 4.68 10*6/MM3 (ref 4.14–5.8)
RBC # UR STRIP: ABNORMAL /HPF
REF LAB TEST METHOD: ABNORMAL
RSV RNA NPH QL NAA+NON-PROBE: NOT DETECTED
SARS-COV-2 RNA RESP QL NAA+PROBE: DETECTED
SODIUM SERPL-SCNC: 136 MMOL/L (ref 136–145)
SP GR UR STRIP: 1.01 (ref 1–1.03)
SQUAMOUS #/AREA URNS HPF: ABNORMAL /HPF
TROPONIN T SERPL HS-MCNC: 49 NG/L
UROBILINOGEN UR QL STRIP: ABNORMAL
WBC # UR STRIP: ABNORMAL /HPF
WBC NRBC COR # BLD AUTO: 5.64 10*3/MM3 (ref 3.4–10.8)
WHOLE BLOOD HOLD COAG: NORMAL
WHOLE BLOOD HOLD SPECIMEN: NORMAL

## 2024-01-18 PROCEDURE — 25010000002 CEFTRIAXONE PER 250 MG: Performed by: EMERGENCY MEDICINE

## 2024-01-18 PROCEDURE — 93005 ELECTROCARDIOGRAM TRACING: CPT | Performed by: EMERGENCY MEDICINE

## 2024-01-18 PROCEDURE — 83880 ASSAY OF NATRIURETIC PEPTIDE: CPT | Performed by: EMERGENCY MEDICINE

## 2024-01-18 PROCEDURE — 36415 COLL VENOUS BLD VENIPUNCTURE: CPT

## 2024-01-18 PROCEDURE — 63710000001 INSULIN LISPRO (HUMAN) PER 5 UNITS: Performed by: INTERNAL MEDICINE

## 2024-01-18 PROCEDURE — 86140 C-REACTIVE PROTEIN: CPT | Performed by: FAMILY MEDICINE

## 2024-01-18 PROCEDURE — 99236 HOSP IP/OBS SAME DATE HI 85: CPT | Performed by: INTERNAL MEDICINE

## 2024-01-18 PROCEDURE — 94799 UNLISTED PULMONARY SVC/PX: CPT

## 2024-01-18 PROCEDURE — 87637 SARSCOV2&INF A&B&RSV AMP PRB: CPT | Performed by: EMERGENCY MEDICINE

## 2024-01-18 PROCEDURE — 85025 COMPLETE CBC W/AUTO DIFF WBC: CPT | Performed by: EMERGENCY MEDICINE

## 2024-01-18 PROCEDURE — 93010 ELECTROCARDIOGRAM REPORT: CPT | Performed by: INTERNAL MEDICINE

## 2024-01-18 PROCEDURE — 71045 X-RAY EXAM CHEST 1 VIEW: CPT

## 2024-01-18 PROCEDURE — 82948 REAGENT STRIP/BLOOD GLUCOSE: CPT | Performed by: INTERNAL MEDICINE

## 2024-01-18 PROCEDURE — 25010000002 METHYLPREDNISOLONE PER 125 MG: Performed by: EMERGENCY MEDICINE

## 2024-01-18 PROCEDURE — 84484 ASSAY OF TROPONIN QUANT: CPT | Performed by: EMERGENCY MEDICINE

## 2024-01-18 PROCEDURE — 83605 ASSAY OF LACTIC ACID: CPT | Performed by: EMERGENCY MEDICINE

## 2024-01-18 PROCEDURE — 25010000002 AZITHROMYCIN PER 500 MG: Performed by: EMERGENCY MEDICINE

## 2024-01-18 PROCEDURE — 83735 ASSAY OF MAGNESIUM: CPT | Performed by: EMERGENCY MEDICINE

## 2024-01-18 PROCEDURE — 82948 REAGENT STRIP/BLOOD GLUCOSE: CPT | Performed by: FAMILY MEDICINE

## 2024-01-18 PROCEDURE — 81001 URINALYSIS AUTO W/SCOPE: CPT | Performed by: EMERGENCY MEDICINE

## 2024-01-18 PROCEDURE — 87086 URINE CULTURE/COLONY COUNT: CPT | Performed by: EMERGENCY MEDICINE

## 2024-01-18 PROCEDURE — 94618 PULMONARY STRESS TESTING: CPT

## 2024-01-18 PROCEDURE — 87040 BLOOD CULTURE FOR BACTERIA: CPT | Performed by: EMERGENCY MEDICINE

## 2024-01-18 PROCEDURE — 25010000002 ENOXAPARIN PER 10 MG: Performed by: FAMILY MEDICINE

## 2024-01-18 PROCEDURE — 25810000003 SODIUM CHLORIDE 0.9 % SOLUTION: Performed by: EMERGENCY MEDICINE

## 2024-01-18 PROCEDURE — 94640 AIRWAY INHALATION TREATMENT: CPT

## 2024-01-18 PROCEDURE — 99285 EMERGENCY DEPT VISIT HI MDM: CPT

## 2024-01-18 PROCEDURE — 80053 COMPREHEN METABOLIC PANEL: CPT | Performed by: EMERGENCY MEDICINE

## 2024-01-18 RX ORDER — ALBUTEROL SULFATE 2.5 MG/3ML
2.5 SOLUTION RESPIRATORY (INHALATION) EVERY 4 HOURS PRN
Status: DISCONTINUED | OUTPATIENT
Start: 2024-01-18 | End: 2024-01-18 | Stop reason: HOSPADM

## 2024-01-18 RX ORDER — ASPIRIN 81 MG/1
81 TABLET ORAL DAILY
Status: DISCONTINUED | OUTPATIENT
Start: 2024-01-18 | End: 2024-01-18 | Stop reason: HOSPADM

## 2024-01-18 RX ORDER — DOXYCYCLINE 100 MG/1
100 CAPSULE ORAL EVERY 12 HOURS SCHEDULED
Qty: 14 CAPSULE | Refills: 0 | Status: SHIPPED | OUTPATIENT
Start: 2024-01-18 | End: 2024-01-25

## 2024-01-18 RX ORDER — IPRATROPIUM BROMIDE AND ALBUTEROL SULFATE 2.5; .5 MG/3ML; MG/3ML
3 SOLUTION RESPIRATORY (INHALATION) 2 TIMES DAILY
Status: DISCONTINUED | OUTPATIENT
Start: 2024-01-18 | End: 2024-01-18

## 2024-01-18 RX ORDER — ACETAMINOPHEN 325 MG/1
975 TABLET ORAL ONCE
Status: COMPLETED | OUTPATIENT
Start: 2024-01-18 | End: 2024-01-18

## 2024-01-18 RX ORDER — INSULIN LISPRO 100 [IU]/ML
2-9 INJECTION, SOLUTION INTRAVENOUS; SUBCUTANEOUS
Status: DISCONTINUED | OUTPATIENT
Start: 2024-01-18 | End: 2024-01-18

## 2024-01-18 RX ORDER — IPRATROPIUM BROMIDE AND ALBUTEROL SULFATE 2.5; .5 MG/3ML; MG/3ML
3 SOLUTION RESPIRATORY (INHALATION) ONCE
Status: COMPLETED | OUTPATIENT
Start: 2024-01-18 | End: 2024-01-18

## 2024-01-18 RX ORDER — NICOTINE POLACRILEX 4 MG
15 LOZENGE BUCCAL
Status: DISCONTINUED | OUTPATIENT
Start: 2024-01-18 | End: 2024-01-18

## 2024-01-18 RX ORDER — IBUPROFEN 600 MG/1
1 TABLET ORAL
Status: DISCONTINUED | OUTPATIENT
Start: 2024-01-18 | End: 2024-01-18 | Stop reason: HOSPADM

## 2024-01-18 RX ORDER — NICOTINE POLACRILEX 4 MG
15 LOZENGE BUCCAL
Status: DISCONTINUED | OUTPATIENT
Start: 2024-01-18 | End: 2024-01-18 | Stop reason: HOSPADM

## 2024-01-18 RX ORDER — DOXYCYCLINE 100 MG/1
100 CAPSULE ORAL EVERY 12 HOURS SCHEDULED
Status: DISCONTINUED | OUTPATIENT
Start: 2024-01-18 | End: 2024-01-18 | Stop reason: HOSPADM

## 2024-01-18 RX ORDER — ENOXAPARIN SODIUM 100 MG/ML
40 INJECTION SUBCUTANEOUS DAILY
Status: DISCONTINUED | OUTPATIENT
Start: 2024-01-18 | End: 2024-01-18 | Stop reason: HOSPADM

## 2024-01-18 RX ORDER — FINASTERIDE 5 MG/1
5 TABLET, FILM COATED ORAL DAILY
Status: DISCONTINUED | OUTPATIENT
Start: 2024-01-18 | End: 2024-01-18 | Stop reason: HOSPADM

## 2024-01-18 RX ORDER — BUDESONIDE 0.5 MG/2ML
0.5 INHALANT ORAL
Status: DISCONTINUED | OUTPATIENT
Start: 2024-01-18 | End: 2024-01-18 | Stop reason: HOSPADM

## 2024-01-18 RX ORDER — DEXTROSE MONOHYDRATE 25 G/50ML
25 INJECTION, SOLUTION INTRAVENOUS
Status: DISCONTINUED | OUTPATIENT
Start: 2024-01-18 | End: 2024-01-18

## 2024-01-18 RX ORDER — ARFORMOTEROL TARTRATE 15 UG/2ML
15 SOLUTION RESPIRATORY (INHALATION)
Status: DISCONTINUED | OUTPATIENT
Start: 2024-01-18 | End: 2024-01-18 | Stop reason: HOSPADM

## 2024-01-18 RX ORDER — TAMSULOSIN HYDROCHLORIDE 0.4 MG/1
0.4 CAPSULE ORAL DAILY
Status: DISCONTINUED | OUTPATIENT
Start: 2024-01-18 | End: 2024-01-18 | Stop reason: HOSPADM

## 2024-01-18 RX ORDER — METHYLPREDNISOLONE SODIUM SUCCINATE 125 MG/2ML
125 INJECTION, POWDER, LYOPHILIZED, FOR SOLUTION INTRAMUSCULAR; INTRAVENOUS ONCE
Status: COMPLETED | OUTPATIENT
Start: 2024-01-18 | End: 2024-01-18

## 2024-01-18 RX ORDER — IPRATROPIUM BROMIDE AND ALBUTEROL SULFATE 2.5; .5 MG/3ML; MG/3ML
3 SOLUTION RESPIRATORY (INHALATION)
Status: DISCONTINUED | OUTPATIENT
Start: 2024-01-18 | End: 2024-01-18 | Stop reason: HOSPADM

## 2024-01-18 RX ORDER — ATORVASTATIN CALCIUM 10 MG/1
10 TABLET, FILM COATED ORAL NIGHTLY
Status: DISCONTINUED | OUTPATIENT
Start: 2024-01-18 | End: 2024-01-18 | Stop reason: HOSPADM

## 2024-01-18 RX ORDER — FLUTICASONE PROPIONATE 50 MCG
2 SPRAY, SUSPENSION (ML) NASAL DAILY
Status: DISCONTINUED | OUTPATIENT
Start: 2024-01-18 | End: 2024-01-18 | Stop reason: HOSPADM

## 2024-01-18 RX ORDER — BENZONATATE 100 MG/1
200 CAPSULE ORAL 3 TIMES DAILY PRN
Status: DISCONTINUED | OUTPATIENT
Start: 2024-01-18 | End: 2024-01-18 | Stop reason: HOSPADM

## 2024-01-18 RX ORDER — INSULIN LISPRO 100 [IU]/ML
3-14 INJECTION, SOLUTION INTRAVENOUS; SUBCUTANEOUS
Status: DISCONTINUED | OUTPATIENT
Start: 2024-01-18 | End: 2024-01-18 | Stop reason: HOSPADM

## 2024-01-18 RX ORDER — IBUPROFEN 600 MG/1
1 TABLET ORAL
Status: DISCONTINUED | OUTPATIENT
Start: 2024-01-18 | End: 2024-01-18

## 2024-01-18 RX ORDER — DEXAMETHASONE 6 MG/1
6 TABLET ORAL EVERY 24 HOURS
Qty: 8 TABLET | Refills: 0 | Status: SHIPPED | OUTPATIENT
Start: 2024-01-19

## 2024-01-18 RX ORDER — NITROGLYCERIN 0.4 MG/1
0.4 TABLET SUBLINGUAL
Status: DISCONTINUED | OUTPATIENT
Start: 2024-01-18 | End: 2024-01-18 | Stop reason: HOSPADM

## 2024-01-18 RX ORDER — CLONAZEPAM 0.5 MG/1
2 TABLET ORAL NIGHTLY
Status: DISCONTINUED | OUTPATIENT
Start: 2024-01-18 | End: 2024-01-18 | Stop reason: HOSPADM

## 2024-01-18 RX ORDER — AMLODIPINE BESYLATE 5 MG/1
10 TABLET ORAL
Status: DISCONTINUED | OUTPATIENT
Start: 2024-01-18 | End: 2024-01-18 | Stop reason: HOSPADM

## 2024-01-18 RX ORDER — SULFASALAZINE 500 MG/1
1000 TABLET ORAL 2 TIMES DAILY
Status: DISCONTINUED | OUTPATIENT
Start: 2024-01-18 | End: 2024-01-18 | Stop reason: HOSPADM

## 2024-01-18 RX ORDER — AZELASTINE HYDROCHLORIDE 137 UG/1
2 SPRAY, METERED NASAL 2 TIMES DAILY
COMMUNITY
Start: 2024-01-04

## 2024-01-18 RX ORDER — SODIUM CHLORIDE 0.9 % (FLUSH) 0.9 %
10 SYRINGE (ML) INJECTION AS NEEDED
Status: DISCONTINUED | OUTPATIENT
Start: 2024-01-18 | End: 2024-01-18 | Stop reason: HOSPADM

## 2024-01-18 RX ORDER — DEXTROSE MONOHYDRATE 25 G/50ML
25 INJECTION, SOLUTION INTRAVENOUS
Status: DISCONTINUED | OUTPATIENT
Start: 2024-01-18 | End: 2024-01-18 | Stop reason: HOSPADM

## 2024-01-18 RX ORDER — METFORMIN HYDROCHLORIDE 500 MG/1
1 TABLET, EXTENDED RELEASE ORAL EVERY 12 HOURS SCHEDULED
COMMUNITY
Start: 2024-01-04

## 2024-01-18 RX ORDER — IPRATROPIUM BROMIDE AND ALBUTEROL SULFATE 2.5; .5 MG/3ML; MG/3ML
3 SOLUTION RESPIRATORY (INHALATION) EVERY 4 HOURS PRN
Status: DISCONTINUED | OUTPATIENT
Start: 2024-01-18 | End: 2024-01-18 | Stop reason: HOSPADM

## 2024-01-18 RX ORDER — GUAIFENESIN 600 MG/1
600 TABLET, EXTENDED RELEASE ORAL EVERY 12 HOURS SCHEDULED
Status: DISCONTINUED | OUTPATIENT
Start: 2024-01-18 | End: 2024-01-18 | Stop reason: HOSPADM

## 2024-01-18 RX ORDER — ALBUTEROL SULFATE 90 UG/1
2 AEROSOL, METERED RESPIRATORY (INHALATION) EVERY 4 HOURS PRN
Qty: 8 G | Refills: 0 | Status: SHIPPED | OUTPATIENT
Start: 2024-01-18

## 2024-01-18 RX ORDER — GLIPIZIDE 5 MG/1
1 TABLET, FILM COATED, EXTENDED RELEASE ORAL DAILY
COMMUNITY
Start: 2024-01-04

## 2024-01-18 RX ADMIN — SULFASALAZINE 1000 MG: 500 TABLET ORAL at 09:51

## 2024-01-18 RX ADMIN — SODIUM CHLORIDE 500 ML: 9 INJECTION, SOLUTION INTRAVENOUS at 02:21

## 2024-01-18 RX ADMIN — ASPIRIN 81 MG: 81 TABLET, COATED ORAL at 09:52

## 2024-01-18 RX ADMIN — CEFTRIAXONE SODIUM 2000 MG: 2 INJECTION, POWDER, FOR SOLUTION INTRAMUSCULAR; INTRAVENOUS at 03:31

## 2024-01-18 RX ADMIN — BUDESONIDE 0.5 MG: 0.5 SUSPENSION RESPIRATORY (INHALATION) at 11:49

## 2024-01-18 RX ADMIN — INSULIN LISPRO 12 UNITS: 100 INJECTION, SOLUTION INTRAVENOUS; SUBCUTANEOUS at 13:08

## 2024-01-18 RX ADMIN — ACETAMINOPHEN 975 MG: 325 TABLET ORAL at 02:18

## 2024-01-18 RX ADMIN — IPRATROPIUM BROMIDE AND ALBUTEROL SULFATE 3 ML: .5; 3 SOLUTION RESPIRATORY (INHALATION) at 11:49

## 2024-01-18 RX ADMIN — FINASTERIDE 5 MG: 5 TABLET, FILM COATED ORAL at 09:52

## 2024-01-18 RX ADMIN — ARFORMOTEROL TARTRATE 15 MCG: 15 SOLUTION RESPIRATORY (INHALATION) at 11:49

## 2024-01-18 RX ADMIN — FLUTICASONE PROPIONATE 2 SPRAY: 50 SPRAY, METERED NASAL at 09:51

## 2024-01-18 RX ADMIN — METHYLPREDNISOLONE SODIUM SUCCINATE 125 MG: 125 INJECTION INTRAMUSCULAR; INTRAVENOUS at 02:46

## 2024-01-18 RX ADMIN — AMLODIPINE BESYLATE 10 MG: 5 TABLET ORAL at 09:52

## 2024-01-18 RX ADMIN — TAMSULOSIN HYDROCHLORIDE 0.4 MG: 0.4 CAPSULE ORAL at 09:52

## 2024-01-18 RX ADMIN — AZITHROMYCIN 500 MG: 500 INJECTION, POWDER, LYOPHILIZED, FOR SOLUTION INTRAVENOUS at 04:34

## 2024-01-18 RX ADMIN — ENOXAPARIN SODIUM 40 MG: 100 INJECTION SUBCUTANEOUS at 09:51

## 2024-01-18 RX ADMIN — IPRATROPIUM BROMIDE AND ALBUTEROL SULFATE 3 ML: .5; 3 SOLUTION RESPIRATORY (INHALATION) at 02:40

## 2024-01-18 RX ADMIN — GUAIFENESIN 600 MG: 600 TABLET ORAL at 09:51

## 2024-01-18 NOTE — PROCEDURES
Walking Oximetry Progress Note      Patient Name:  Aston Vergara  YOB: 1945  Date of Procedure: 01/18/24              ROOM AIR BASELINE   SpO2%   92%   Heart Rate 108     EXERCISE ON ROOM AIR SpO2% EXERCISE ON O2 LPM SpO2%   1 MINUTE 93% 1 MINUTE     2 MINUTES                  92% 2 MINUTES     3 MINUTES 92% 3 MINUTES     4 MINUTES                  92% 4 MINUTES     5 MINUTES                  93% 5 MINUTES     6 MINUTES                  93% 6 MINUTES                Time to Recovery  1 Minute   SpO2% Post Exercise  93% on room air.    HR Post Exercise  115     Comments:           Electronically signed by Alisia Troy CRT, 01/18/24, 12:24 PM EST.

## 2024-01-18 NOTE — PLAN OF CARE
Goal Outcome Evaluation:               Pt discharging home with family.

## 2024-01-18 NOTE — H&P
[facility]   HISTORY AND PHYSICAL    Patient Name: Aston Vergara  : 1945  MRN: [id number]  Primary Care Physician:  Gigi Daley DO  Date of admission: 2024    Subjective   Subjective     Chief Complaint: Fevers, chills    HPI:    Aston Vergara is a 78 y.o. male with past medical history of diabetes, hypertension, hyperlipidemia, GEOVANI, UC, and GERD presented to ED with complaints of fevers, rigors, and malaise.  Patient states that yesterday afternoon he began to feel weak, fatigued, and with intermittent fevers and chills.  Overnight patient began to experience rigors so he came to the ED for further evaluation.  He did state that he went to a  2 days ago where he may have been exposed to viral illnesses.  In the ED patient had borderline fever with tachycardia and hypoxia requiring oxygen supplementation.  Labs showed that he was COVID-positive with remaining being relatively unremarkable given his chronic conditions clinical normal white count.  Chest x-ray showed new bilateral infiltrates.  When asked she denied any headaches, focal weakness, chest pain, palpitation, abdominal pain, nausea, vomiting, diarrhea, constipation, dysuria, hematuria, hematochezia, melena, or anxiety.      Review of Systems   All systems were reviewed and negative except for: As per HPI    Personal History     Past Medical History:   Diagnosis Date    Acid reflux     Allergic rhinitis     Arthritis     Diabetes     High blood pressure     History of knee joint replacement 2018    right    Hypertension     Sleep apnea     Ulcerative colitis        Past Surgical History:   Procedure Laterality Date    COLONOSCOPY      ENDOSCOPY      INTRAOCULAR LENS INSERTION      JOINT REPLACEMENT      KNEE SURGERY      both knee replaced    OTHER SURGICAL HISTORY      artificial joints/limbs       Family History: family history includes Arthritis in his father and mother; Diabetes in his mother. Otherwise  pertinent FHx was reviewed and not pertinent to current issue.    Social History:  reports that he has quit smoking. He has been exposed to tobacco smoke. He has quit using smokeless tobacco.  His smokeless tobacco use included chew. He reports current alcohol use. He reports that he does not use drugs.    Home Medications:  Tart Cherry, allopurinol, amLODIPine, aspirin, atorvastatin, clonazePAM, diclofenac, dutasteride, fluticasone, glipizide, losartan-hydrochlorothiazide, metFORMIN, sulfaSALAzine, and tamsulosin      Allergies:  Allergies   Allergen Reactions    Colchicine Nausea Only     And diarrhea       Objective   Objective     Vitals:   Temp:  [97.7 °F (36.5 °C)-100 °F (37.8 °C)] 97.7 °F (36.5 °C)  Heart Rate:  [] 84  Resp:  [16-20] 20  BP: (107-150)/(53-79) 123/73  Flow (L/min):  [3-4] 4  Physical Exam    Constitutional: Awake, alert   Eyes: PERRLA, sclerae anicteric, no conjunctival injection   HENT: NCAT, mucous membranes moist   Neck: Supple, no thyromegaly, no lymphadenopathy, trachea midline   Respiratory: Clear to auscultation bilaterally, nonlabored respirations    Cardiovascular: RRR, no murmurs, rubs, or gallops, palpable pedal pulses bilaterally   Gastrointestinal: Positive bowel sounds, soft, nontender, nondistended   Musculoskeletal: No bilateral ankle edema, no clubbing or cyanosis to extremities   Psychiatric: Appropriate affect, cooperative   Neurologic: Oriented x 3, strength symmetric in all extremities, Cranial Nerves grossly intact to confrontation, speech clear   Skin: No rashes     Result Review    Result Review:  I have personally reviewed the results from the time of this admission to 1/18/2024 06:10 EST and agree with these findings:  [x]  Laboratory list / accordion  []  Microbiology  [x]  Radiology  [x]  EKG/Telemetry   []  Cardiology/Vascular   []  Pathology  []  Old records  []  Other:  Most notable findings include: COVID-positive, x-ray with pneumonia      Assessment &  Plan   Assessment / Plan     Brief Patient Summary:  Aston Vergara is a 78 y.o. male with past medical history of diabetes, hypertension, hyperlipidemia, GEOVANI, UC, and GERD presented to ED with complaints of fevers, rigors, and malaise.    Active Hospital Problems:  Active Hospital Problems    Diagnosis     **Pneumonia due to COVID-19 virus     Acute-on-chronic respiratory failure     Diabetes mellitus with nephropathy     Essential hypertension     GEOVANI (obstructive sleep apnea)      Plan:     COVID-19 pneumonia  -Admit to telemetry  -Imaging reviewed  -Supplemental oxygen as needed, wean down as tolerated  -ABG  -Dexamethasone daily.  -Remdesivir if needed  -Duo nebs 3 times daily and as needed  -We will hold off on remdesivir for now  -Empiric antibiotics   -Mucinex, Tessalon Perles  -Incentive spirometry  -Consult pulmonology if warranted  -Supportive care    Diabetes  -Insulin sliding scale  -Levemir at bedtime  -Titrate as needed    HTN  -Currently well controlled  -PRN BP meds  -Resume home meds when available  -Titrate if needed    GEOVANI  -BiPAP HS    GI ppx  DVT ppx    DVT prophylaxis:  Medical DVT prophylaxis orders are present.    CODE STATUS: Full code     Admission Status:  I believe this patient meets inpatient status.      Electronically signed by Isreal Whitlock MD, 01/18/24, 6:10 AM EST.

## 2024-01-18 NOTE — THERAPY EVALUATION
Patient called into after hours answering service at: 2017-07-08 14:07:03 PDT     Reported:    Patient has mouth sores and having trouble swallowing.       -07-08 18:00:57 PDTre   Respiratory Therapist Broncho-Pulmonary Hygiene Progress Note      Patient Name:  Aston Vergara  YOB: 1945    Aston Vergara meets the qualification for Level 1 of the Bronco-Pulmonary Hygiene Protocol. This was based on my daily patient assessment and includes review of chest x-ray results, cough ability and quality, oxygenation, secretions or risk for secretion development and patient mobility.     Broncho-Pulmonary Hygiene Assessment:    Level of Movement: Actively changing positions-requires assistance  Disoriented/Follows Commands    Breath Sounds: Diminished and/or coarse rhonchi    Cough: Strong, effective and/or frequent    Chest X-Ray: Possible signs of consolidation and/or atelectasis or clear.     Sputum Productions: None or small amount of thin or watery secretions with effective cough    History and Physical: Chronic condition    SpO2 to Oxygen Need: greater than 92% on 4-6L nasal canula    Current SpO2 is: 91 on 4lp    Based on this information, I have completed the following interventions: Aerobika with bronchodialtor medication or TID      Electronically signed by Mark Bourne RRT, 01/18/24, 9:38 AM EST.

## 2024-01-18 NOTE — DISCHARGE SUMMARY
[facility]         HOSPITALIST  DISCHARGE SUMMARY    Patient Name: Aston Vergara  : 1945  MRN: [id number]    Date of Admission: 2024  Date of Discharge: 2024  Primary Care Physician: Gigi Daley DO    Consults       Date and Time Order Name Status Description    2024  2:26 AM Inpatient Hospitalist Consult              Active and Resolved Hospital Problems:  Active Hospital Problems    Diagnosis POA   • **Pneumonia due to COVID-19 virus [U07.1, J12.82] Yes   • Acute-on-chronic respiratory failure [J96.20] Yes   • Diabetes mellitus with nephropathy [E11.21] Unknown   • Essential hypertension [I10] Unknown   • GEOVANI (obstructive sleep apnea) [G47.33] Unknown      Resolved Hospital Problems   No resolved problems to display.   COVID-19 pneumonia  Hypoxia  Type 2 diabetes mellitus  Hypertension  GEOVANI    Hospital Course     Hospital Course:  Aston Vergara is a 78 y.o. male with past medical history of diabetes, hypertension, hyperlipidemia, GEOVANI, UC, and GERD presented to ED with complaints of fevers, rigors, and malaise. In the ED patient had borderline fever with tachycardia and hypoxia requiring oxygen supplementation.  Labs showed that he was COVID-positive, chest x-ray showed bilateral infiltrates.  He was admitted for further care, started on IV Decadron.  The patient improved rapidly and the following morning, stated he felt well enough to go home.  A walk test was performed and he passed, will not require oxygen at discharge.  As he was hemodynamically stable, not requiring oxygen, and feeling significantly better, he was discharged home in stable condition on 2024.  He will complete a 10-day course of Decadron.  He was given albuterol to use as needed.  Recommend follow-up with PCP within 1 week.    Day of Discharge     Vital Signs:  Temp:  [97.7 °F (36.5 °C)-100 °F (37.8 °C)] 98.1 °F (36.7 °C)  Heart Rate:  [] 94  Resp:  [16-22] 22  BP: (107-150)/(53-84)  140/68  Flow (L/min):  [3-4] 4  Physical Exam:   Gen: NAD, WDWN  ENT: PERRL, EOMI   CV: RRR no MRG  Pulm: CTAB, no w/r/r  GI: Abd soft, NTND, +bs  Neuro: Moving all extremities spontaneously, CN II-XII grossly intact   Psych: A&O*3, normal mood and affect  Skin: No lesions or rashes noted    Discharge Details        Discharge Medications        New Medications        Instructions Start Date   albuterol sulfate  (90 Base) MCG/ACT inhaler  Commonly known as: PROVENTIL HFA;VENTOLIN HFA;PROAIR HFA   2 puffs, Inhalation, Every 4 Hours PRN, Okay to substitute per formulary      dexAMETHasone 6 MG tablet  Commonly known as: DECADRON   6 mg, Oral, Every 24 Hours   Start Date: January 19, 2024     doxycycline 100 MG capsule  Commonly known as: MONODOX   100 mg, Oral, Every 12 Hours Scheduled             Continue These Medications        Instructions Start Date   allopurinol 300 MG tablet  Commonly known as: ZYLOPRIM   Take 1 tablet by mouth As Needed.      amLODIPine 10 MG tablet  Commonly known as: NORVASC   10 mg, Oral, Daily      aspirin 81 MG EC tablet   Take 1 tablet by mouth Daily.      atorvastatin 10 MG tablet  Commonly known as: LIPITOR   Take 1 tablet by mouth Every Night.      Azelastine HCl 137 MCG/SPRAY solution   2 sprays, Each Nare, 2 Times Daily      clonazePAM 2 MG tablet  Commonly known as: KlonoPIN   2 mg, Oral, Every Night at Bedtime      diclofenac 50 MG EC tablet  Commonly known as: VOLTAREN   Take 1 tablet by mouth 2 (Two) Times a Day.      dutasteride 0.5 MG capsule  Commonly known as: AVODART   0.5 mg, Oral, Daily      fluticasone 50 MCG/ACT nasal spray  Commonly known as: FLONASE   2 sprays into the nostril(s) as directed by provider Daily.      glipizide 5 MG ER tablet  Commonly known as: GLUCOTROL XL   1 tablet, Oral, Daily      losartan-hydrochlorothiazide 100-12.5 MG per tablet  Commonly known as: HYZAAR   Take As Directed      metFORMIN  MG 24 hr tablet  Commonly known as:  GLUCOPHAGE-XR   1 tablet, Oral, Every 12 Hours Scheduled      sulfaSALAzine 500 MG tablet  Commonly known as: AZULFIDINE   1,000 mg, Oral, 2 Times Daily      tamsulosin 0.4 MG capsule 24 hr capsule  Commonly known as: FLOMAX   Take 2 capsules by mouth Daily.      TART JARQUIN PO   Oral, Daily PRN               Allergies   Allergen Reactions   • Colchicine Nausea Only     And diarrhea       Discharge Disposition:  Home or Self Care    Diet:  Hospital:  Diet Order   Procedures   • Diet: Cardiac Diets; Healthy Heart (2-3 Na+); Texture: Regular Texture (IDDSI 7); Fluid Consistency: Thin (IDDSI 0)       Discharge Activity:   Activity Instructions       Activity as Tolerated              CODE STATUS:  Code Status and Medical Interventions:   Ordered at: 01/18/24 0838     Level Of Support Discussed With:    Patient     Code Status (Patient has no pulse and is not breathing):    CPR (Attempt to Resuscitate)     Medical Interventions (Patient has pulse or is breathing):    Full Support         Future Appointments   Date Time Provider Department Center   12/17/2024  2:15 PM Meir Carrillo MD [facility]       Additional Instructions for the Follow-ups that You Need to Schedule       Discharge Follow-up with PCP   As directed       Currently Documented PCP:    Gigi Daley DO    PCP Phone Number:    [phone]     Follow Up Details: 3-5 days                Pertinent  and/or Most Recent Results     PROCEDURES:   None    LAB RESULTS:      Lab 01/18/24  0026 01/18/24  0023   WBC  --  5.64   HEMOGLOBIN  --  14.4   HEMATOCRIT  --  42.6   PLATELETS  --  147   NEUTROS ABS  --  4.14   IMMATURE GRANS (ABS)  --  0.02   LYMPHS ABS  --  0.59*   MONOS ABS  --  0.83   EOS ABS  --  0.03   MCV  --  91.0   CRP  --  5.36*   LACTATE 1.4  --          Lab 01/18/24  0023   SODIUM 136   POTASSIUM 4.1   CHLORIDE 97*   CO2 24.5   ANION GAP 14.5   BUN 18   CREATININE 1.02   EGFR 74.8   GLUCOSE 157*   CALCIUM 9.5   MAGNESIUM 1.7          Lab 01/18/24  0023   TOTAL PROTEIN 7.8   ALBUMIN 4.5   GLOBULIN 3.3   ALT (SGPT) 28   AST (SGOT) 20   BILIRUBIN 0.3   ALK PHOS 75         Lab 01/18/24  0023   PROBNP 575.2   HSTROP T 49*                 Brief Urine Lab Results  (Last result in the past 365 days)        Color   Clarity   Blood   Leuk Est   Nitrite   Protein   CREAT   Urine HCG        01/18/24 0028 Yellow   Clear   Small (1+)   Negative   Negative   >=300 mg/dL (3+)                 Microbiology Results (last 10 days)       Procedure Component Value - Date/Time    COVID-19, FLU A/B, RSV PCR 1 HR TAT - Swab, Nasopharynx [[id number]]  (Abnormal) Collected: 01/18/24 0054    Lab Status: Final result Specimen: Swab from Nasopharynx Updated: 01/18/24 0145     COVID19 Detected     Influenza A PCR Not Detected     Influenza B PCR Not Detected     RSV, PCR Not Detected    Narrative:      Fact sheet for providers: https://www.fda.gov/media/938664/download    Fact sheet for patients: https://www.fda.gov/media/813118/download    Test performed by PCR.            XR Chest 1 View    Result Date: 1/18/2024   New bilateral infiltrates are seen.  The findings may represent infectious multifocal pneumonia.  Pulmonary edema is possible.  Mild-to-moderate cardiomegaly is suggested.      Please note that portions of this note were completed with a voice recognition program.  GUILLERMO GRACIA JR, MD       Electronically Signed and Approved By: GUILLERMO GRACIA JR, MD on 1/18/2024 at 0:33                            Labs Pending at Discharge:  Pending Labs       Order Current Status    Blood Culture - Blood, Arm, Left In process    Blood Culture - Blood, Arm, Right In process    Urine Culture - Urine, Urine, Clean Catch In process              Time spent on Discharge including face to face service: 33 minutes    Electronically signed by Ronak Arteaga MD, 01/18/24, 1:43 PM EST.

## 2024-01-18 NOTE — PLAN OF CARE
Goal Outcome Evaluation:  Patient got admitted to the floor at end of shift, no complaints of pain, VSS. Falls precautions in place

## 2024-01-18 NOTE — OUTREACH NOTE
Prep Survey      Flowsheet Row Responses   Worship facility patient discharged from? [place]   Is LACE score < 7 ? Yes   Eligibility Not Eligible   What are the reasons patient is not eligible? Other  [Low readmission risk]   Does the patient have one of the following disease processes/diagnoses(primary or secondary)? Other   Prep survey completed? Yes            JADA CARLTON - Registered Nurse

## 2024-01-18 NOTE — ED NOTES
Pt arrived via ems from home, ems called for gen weakness. Pt states taht he got real weak and wasn't able to get up.

## 2024-01-18 NOTE — ED PROVIDER NOTES
Time: 2:33 AM EST  Date of encounter:  1/18/2024  Independent Historian/Clinical History and Information was obtained by:   Patient    History is limited by: N/A    Chief Complaint: weakness      History of Present Illness:  Patient is a 78 y.o. year old male who presents to the emergency department for evaluation of Generalized body aches, shortness of breath, chills.  Patient started feeling ill today.  Family was concerned and called EMS.  Patient reported was too weak to get up.  He reports cough.  No nausea vomiting.  No chest pain.      HPI    Patient Care Team  Primary Care Provider: Gigi Daley DO    Past Medical History:     Allergies   Allergen Reactions    Colchicine Nausea Only     And diarrhea     Past Medical History:   Diagnosis Date    Acid reflux     Allergic rhinitis     Arthritis     Diabetes     High blood pressure     History of knee joint replacement 12/18/2018    right    Hypertension     Sleep apnea     Ulcerative colitis      Past Surgical History:   Procedure Laterality Date    COLONOSCOPY  2020    ENDOSCOPY  2017    INTRAOCULAR LENS INSERTION      JOINT REPLACEMENT      KNEE SURGERY      both knee replaced    OTHER SURGICAL HISTORY      artificial joints/limbs     Family History   Problem Relation Age of Onset    Diabetes Mother     Arthritis Mother     Arthritis Father     Colon cancer Neg Hx        Home Medications:  Prior to Admission medications    Medication Sig Start Date End Date Taking? Authorizing Provider   allopurinol (ZYLOPRIM) 300 MG tablet Take 1 tablet by mouth As Needed.    ProviderRocio MD   amLODIPine (NORVASC) 10 MG tablet Take 1 tablet by mouth. 4/10/23   ProviderRocio MD   aspirin 81 MG EC tablet aspirin 81 mg oral tablet,delayed release (DR/EC) take 1 tablet (81 mg) by oral route once daily   Active    ProviderRocio MD   atorvastatin (LIPITOR) 10 MG tablet atorvastatin 10 mg oral tablet take 1 tablet (10 mg) by oral route once daily at  bedtime   Active    Rocio Merida MD   clonazePAM (KlonoPIN) 2 MG tablet Take 1 tablet by mouth every night at bedtime.    Rocio Merida MD   diclofenac (VOLTAREN) 50 MG EC tablet diclofenac sodium 50 mg oral tablet,delayed release (DR/EC) take 1 tablet (50 mg) by oral route 2 times per day   Active    Rocio Merida MD   dutasteride (AVODART) 0.5 MG capsule Take 1 capsule by mouth Daily.    Rocio Merida MD   fluticasone (FLONASE) 50 MCG/ACT nasal spray USE 2 SPRAYS IN EACHN NOSTRIL TWICE DAILY    Rocio Merida MD   glipizide (GLUCOTROL) 5 MG tablet glipizide 5 mg oral tablet take 1 tablet (5 mg) by oral route once daily before a meal   Active    Rocio Merida MD   losartan-hydrochlorothiazide (HYZAAR) 100-12.5 MG per tablet Take As Directed. 11/7/22   Rocio Merida MD   metFORMIN (GLUCOPHAGE) 500 MG tablet metformin 500 mg oral tablet take 1 tablet (500 mg) by oral route 2 times per day with morning and evening meals   Active    Rocio Merida MD   sulfaSALAzine (AZULFIDINE) 500 MG tablet Take 2 tablets by mouth 2 (Two) Times a Day. 12/12/23   Meir Carrillo MD   tamsulosin (FLOMAX) 0.4 MG capsule 24 hr capsule tamsulosin 0.4 mg oral capsule take 1 capsule (0.4 mg) by oral route once daily 1/2 hour following the same meal each day   Active    Provider, Historical, MD   TART CHERRY PO Take  by mouth Daily As Needed.    Rocio Merida MD        Social History:   Social History     Tobacco Use    Smoking status: Former     Passive exposure: Past    Smokeless tobacco: Former     Types: Chew    Tobacco comments:     smoked 30 ciggs per day. quit at age 41   Vaping Use    Vaping Use: Never used   Substance Use Topics    Alcohol use: Yes     Comment: weekly, liquor    Drug use: Never         Review of Systems:  Review of Systems   Constitutional:  Positive for chills and fever.   HENT:  Negative for congestion, ear pain and sore throat.    Eyes:  " Negative for pain.   Respiratory:  Positive for cough and shortness of breath. Negative for chest tightness.    Cardiovascular:  Negative for chest pain.   Gastrointestinal:  Negative for abdominal pain, diarrhea, nausea and vomiting.   Genitourinary:  Negative for flank pain and hematuria.   Musculoskeletal:  Negative for joint swelling.   Skin:  Negative for pallor.   Neurological:  Positive for weakness. Negative for seizures and headaches.   All other systems reviewed and are negative.       Physical Exam:  /53   Pulse 95   Temp 100 °F (37.8 °C) (Oral)   Resp 20   Ht 177.8 cm (70\")   Wt 133 kg (293 lb 3.4 oz)   SpO2 95%   BMI 42.07 kg/m²     Physical Exam  Constitutional:       Appearance: Normal appearance.   HENT:      Head: Normocephalic and atraumatic.      Nose: Nose normal.      Mouth/Throat:      Mouth: Mucous membranes are moist.   Eyes:      Extraocular Movements: Extraocular movements intact.      Conjunctiva/sclera: Conjunctivae normal.      Pupils: Pupils are equal, round, and reactive to light.   Cardiovascular:      Rate and Rhythm: Regular rhythm. Tachycardia present.      Pulses: Normal pulses.      Heart sounds: Normal heart sounds.   Pulmonary:      Effort: Pulmonary effort is normal.      Breath sounds: Wheezing present.   Abdominal:      General: There is no distension.      Palpations: Abdomen is soft.      Tenderness: There is no abdominal tenderness.   Musculoskeletal:         General: Normal range of motion.      Cervical back: Normal range of motion.   Skin:     General: Skin is warm and dry.      Capillary Refill: Capillary refill takes less than 2 seconds.   Neurological:      General: No focal deficit present.      Mental Status: He is alert and oriented to person, place, and time. Mental status is at baseline.   Psychiatric:         Mood and Affect: Mood normal.         Behavior: Behavior normal.                  Procedures:  Procedures      Medical Decision " Making:      Comorbidities that affect care:    Diabetes, Hypertension    External Notes reviewed:    Previous Clinic Note: Patient seen by gastroenterologist on 12/12/2023 for ulcerative rectosigmoiditis, rectal bleeding.      The following orders were placed and all results were independently analyzed by me:  Orders Placed This Encounter   Procedures    COVID-19, FLU A/B, RSV PCR 1 HR TAT - Swab, Nasopharynx    Blood Culture - Blood,    Blood Culture - Blood,    Urine Culture - Urine,    XR Chest 1 View    [place] Draw    Comprehensive Metabolic Panel    Single High Sensitivity Troponin T    Magnesium    CBC Auto Differential    Urinalysis With Culture If Indicated - Urine, Clean Catch    Lactic Acid, Plasma    Urinalysis, Microscopic Only - Urine, Clean Catch    BNP    NPO Diet NPO Type: Strict NPO    Undress & Gown    Continuous Pulse Oximetry    Vital Signs    Orthostatic Blood Pressure    Inpatient Hospitalist Consult    Oxygen Therapy- Nasal Cannula; Titrate 1-6 LPM Per SpO2; 90 - 95%    POC Glucose Once    ECG 12 Lead ED Triage Standing Order; Weak / Dizzy / AMS    Insert Peripheral IV    Inpatient Admission    Fall Precautions    CBC & Differential    Green Top (Gel)    Lavender Top    Gold Top - SST    Light Blue Top       Medications Given in the Emergency Department:  Medications   sodium chloride 0.9 % flush 10 mL (has no administration in time range)   AZITHROMYCIN 500 MG/250 ML 0.9% NS IVPB (vial-mate) (has no administration in time range)   acetaminophen (TYLENOL) tablet 975 mg (975 mg Oral Given 1/18/24 0218)   sodium chloride 0.9 % bolus 500 mL (0 mL Intravenous Stopped 1/18/24 0236)   ipratropium-albuterol (DUO-NEB) nebulizer solution 3 mL (3 mL Nebulization Given 1/18/24 0240)   methylPREDNISolone sodium succinate (SOLU-Medrol) injection 125 mg (125 mg Intravenous Given 1/18/24 0246)   cefTRIAXone (ROCEPHIN) 2000 mg/100 mL 0.9% NS IVPB (MBP) (2,000 mg Intravenous New Bag 1/18/24 0331)        ED  Course:    ED Course as of 01/18/24 0436   Thu Jan 18, 2024   0236 ECG 12 Lead ED Triage Standing Order; Weak / Dizzy / AMS  Sinus rhythm with rate of 131.  No acute ST elevation nonspecific T wave abnormalities.  Normal OH and QTc.  EKG interpreted by me [LD]      ED Course User Index  [LD] Luis Daniel Willis MD       Labs:    Lab Results (last 24 hours)       Procedure Component Value Units Date/Time    CBC & Differential [[id number]]  (Abnormal) Collected: 01/18/24 0023    Specimen: Blood Updated: 01/18/24 0041    Narrative:      The following orders were created for panel order CBC & Differential.  Procedure                               Abnormality         Status                     ---------                               -----------         ------                     CBC Auto Differential[[id number]]        Abnormal            Final result                 Please view results for these tests on the individual orders.    Comprehensive Metabolic Panel [[id number]]  (Abnormal) Collected: 01/18/24 0023    Specimen: Blood Updated: 01/18/24 0059     Glucose 157 mg/dL      BUN 18 mg/dL      Creatinine 1.02 mg/dL      Sodium 136 mmol/L      Potassium 4.1 mmol/L      Chloride 97 mmol/L      CO2 24.5 mmol/L      Calcium 9.5 mg/dL      Total Protein 7.8 g/dL      Albumin 4.5 g/dL      ALT (SGPT) 28 U/L      AST (SGOT) 20 U/L      Alkaline Phosphatase 75 U/L      Total Bilirubin 0.3 mg/dL      Globulin 3.3 gm/dL      A/G Ratio 1.4 g/dL      BUN/Creatinine Ratio 17.6     Anion Gap 14.5 mmol/L      eGFR 74.8 mL/min/1.73     Narrative:      GFR Normal >60  Chronic Kidney Disease <60  Kidney Failure <15    The GFR formula is only valid for adults with stable renal function between ages 18 and 70.    Single High Sensitivity Troponin T [[id number]]  (Abnormal) Collected: 01/18/24 0023    Specimen: Blood Updated: 01/18/24 0059     HS Troponin T 49 ng/L     Narrative:      High Sensitive Troponin T Reference Range:  <14.0 ng/L-  Negative Female for AMI  <22.0 ng/L- Negative Male for AMI  >=14 - Abnormal Female indicating possible myocardial injury.  >=22 - Abnormal Male indicating possible myocardial injury.   Clinicians would have to utilize clinical acumen, EKG, Troponin, and serial changes to determine if it is an Acute Myocardial Infarction or myocardial injury due to an underlying chronic condition.         Magnesium [[id number]]  (Normal) Collected: 01/18/24 0023    Specimen: Blood Updated: 01/18/24 0059     Magnesium 1.7 mg/dL     CBC Auto Differential [[id number]]  (Abnormal) Collected: 01/18/24 0023    Specimen: Blood Updated: 01/18/24 0041     WBC 5.64 10*3/mm3      RBC 4.68 10*6/mm3      Hemoglobin 14.4 g/dL      Hematocrit 42.6 %      MCV 91.0 fL      MCH 30.8 pg      MCHC 33.8 g/dL      RDW 14.2 %      RDW-SD 47.6 fl      MPV 10.0 fL      Platelets 147 10*3/mm3      Neutrophil % 73.4 %      Lymphocyte % 10.5 %      Monocyte % 14.7 %      Eosinophil % 0.5 %      Basophil % 0.5 %      Immature Grans % 0.4 %      Neutrophils, Absolute 4.14 10*3/mm3      Lymphocytes, Absolute 0.59 10*3/mm3      Monocytes, Absolute 0.83 10*3/mm3      Eosinophils, Absolute 0.03 10*3/mm3      Basophils, Absolute 0.03 10*3/mm3      Immature Grans, Absolute 0.02 10*3/mm3      nRBC 0.0 /100 WBC     BNP [[id number]]  (Normal) Collected: 01/18/24 0023    Specimen: Blood Updated: 01/18/24 0108     proBNP 575.2 pg/mL     Narrative:      This assay is used as an aid in the diagnosis of individuals suspected of having heart failure. It can be used as an aid in the diagnosis of acute decompensated heart failure (ADHF) in patients presenting with signs and symptoms of ADHF to the emergency department (ED). In addition, NT-proBNP of <300 pg/mL indicates ADHF is not likely.    Age Range Result Interpretation  NT-proBNP Concentration (pg/mL:      <50             Positive            >450                   Gray                 300-450                    Negative              <300    50-75           Positive            >900                  Gray                300-900                  Negative            <300      >75             Positive            >1800                  Gray                300-1800                  Negative            <300    Blood Culture - Blood, Arm, Left [[id number]] Collected: 01/18/24 0026    Specimen: Blood from Arm, Left Updated: 01/18/24 0037    Blood Culture - Blood, Arm, Right [[id number]] Collected: 01/18/24 0026    Specimen: Blood from Arm, Right Updated: 01/18/24 0037    Lactic Acid, Plasma [[id number]]  (Normal) Collected: 01/18/24 0026    Specimen: Blood Updated: 01/18/24 0055     Lactate 1.4 mmol/L     Urinalysis With Culture If Indicated - Urine, Clean Catch [[id number]]  (Abnormal) Collected: 01/18/24 0028    Specimen: Urine, Clean Catch Updated: 01/18/24 0048     Color, UA Yellow     Appearance, UA Clear     pH, UA 7.5     Specific Gravity, UA 1.013     Glucose, UA Negative     Ketones, UA Negative     Bilirubin, UA Negative     Blood, UA Small (1+)     Protein, UA >=300 mg/dL (3+)     Leuk Esterase, UA Negative     Nitrite, UA Negative     Urobilinogen, UA 0.2 E.U./dL    Narrative:      In absence of clinical symptoms, the presence of pyuria, bacteria, and/or nitrites on the urinalysis result does not correlate with infection.    Urinalysis, Microscopic Only - Urine, Clean Catch [[id number]]  (Abnormal) Collected: 01/18/24 0028    Specimen: Urine, Clean Catch Updated: 01/18/24 0048     RBC, UA 3-5 /HPF      WBC, UA 11-20 /HPF      Bacteria, UA None Seen /HPF      Squamous Epithelial Cells, UA 0-2 /HPF      Hyaline Casts, UA 0-2 /LPF      Methodology Automated Microscopy    Urine Culture - Urine, Urine, Clean Catch [[id number]] Collected: 01/18/24 0028    Specimen: Urine, Clean Catch Updated: 01/18/24 0048    COVID-19, FLU A/B, RSV PCR 1 HR TAT - Swab, Nasopharynx [[id number]]  (Abnormal) Collected: 01/18/24 0054    Specimen: Swab from Nasopharynx  Updated: 01/18/24 0145     COVID19 Detected     Influenza A PCR Not Detected     Influenza B PCR Not Detected     RSV, PCR Not Detected    Narrative:      Fact sheet for providers: https://www.fda.gov/media/591894/download    Fact sheet for patients: https://www.fda.gov/media/265212/download    Test performed by PCR.             Imaging:    XR Chest 1 View    Result Date: 1/18/2024  PROCEDURE: XR CHEST 1 VW  COMPARISON: 8/1/2007.  INDICATIONS: Weak/Dizzy/AMS.  FINDINGS: A single AP upright portable view of the chest is provided for review.  Bilateral infiltrates are seen.  The findings may represent infectious multifocal pneumonia.  Pulmonary edema is possible.  Mild-to-moderate cardiomegaly may be present.  No pneumothorax.  No pneumomediastinum.  No pleural effusion.  External artifacts are noted.  Chronic calcified granulomatous disease involves the chest.  The thoracic aorta is atherosclerotic.  There are suspected old healed bilateral rib fractures. 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  There is rightward deviation of the trachea, which is probably related to vascular ectasia although a left-sided goiter is possible.       New bilateral infiltrates are seen.  The findings may represent infectious multifocal pneumonia.  Pulmonary edema is possible.  Mild-to-moderate cardiomegaly is suggested.      Please note that portions of this note were completed with a voice recognition program.  GUILLERMO GRACIA JR, MD       Electronically Signed and Approved By: GUILLERMO GRACIA JR, MD on 1/18/2024 at 0:33                 Differential Diagnosis and Discussion:    Cough: Differential diagnosis includes but is not limited to pneumonia, acute bronchitis, upper respiratory infection, ACE inhibitor use, allergic reaction, epiglottitis, seasonal allergies, chemical irritants, exercise-induced asthma, viral syndrome.  Dyspnea:  Differential diagnosis includes but is not limited to metabolic acidosis, neurological disorders, psychogenic, asthma, pneumothorax, upper airway obstruction, COPD, pneumonia, noncardiogenic pulmonary edema, interstitial lung disease, anemia, congestive heart failure, and pulmonary embolism  Weakness: Based on the patient's history, signs, and symptoms, the diffential diagnosis includes but is not limited to meningitis, stroke, sepsis, subarachnoid hemorrhage, intracranial bleeding, encephalitis, acute uti, dehydration, MS, myasthenia gravis, Guillan [place], migraine variant, neuromuscular disorders vertigo, electrolyte imbalance, and metabolic disorders.    All labs were reviewed and interpreted by me.  All X-rays impressions were independently interpreted by me.  EKG was interpreted by me.    MDM  Number of Diagnoses or Management Options  Acute respiratory failure with hypoxia  COVID-19  Weakness generalized  Diagnosis management comments: Patient presented with generalized weakness shortness of breath.  O2 sat upper 80s on room air.  Patient was placed on supplemental oxygen.  He does have mild wheezes on exam.  Given nebulizer treatment.  Labs not show any significant abnormality.  Chest x-ray showed bilateral infiltrates.  Swab tested positive for COVID.  General weakness and new oxygen requirement discussed patient with hospitalist and will admit for further care.       Amount and/or Complexity of Data Reviewed  Clinical lab tests: reviewed  Tests in the radiology section of CPT®: reviewed  Review and summarize past medical records: yes  Independent visualization of images, tracings, or specimens: yes    Risk of Complications, Morbidity, and/or Mortality  Presenting problems: moderate  Management options: moderate                 Patient Care Considerations:    SEPSIS IS NOT PRESENT IN THE EMERGENCY DEPARTMENT: Patient meets SIRS criteria in the emergency department however the patient does not have a known  source of bacterial infection to confirm the diagnosis of sepsis.        Consultants/Shared Management Plan:    Hospitalist: I have discussed the case with Dr Connolly who agrees to accept the patient for admission.    Social Determinants of Health:    Patient is independent, reliable, and has access to care.       Disposition and Care Coordination:    Admit:   Through independent evaluation of the patient's history, physical, and imperical data, the patient meets criteria for observation/admission to the hospital.        Final diagnoses:   COVID-19   Acute respiratory failure with hypoxia   Weakness generalized        ED Disposition       ED Disposition   Decision to Admit    Condition   --    Comment   Level of Care: Remote Telemetry [26]   Diagnosis: Pneumonia due to COVID-19 virus [[id number]]   Admitting Physician: KARLO CONNOLLY [[id number]]   Certification: I Certify That Inpatient Hospital Services Are Medically Necessary For Greater Than 2 Midnights                 This medical record created using voice recognition software.             Luis Daniel Willis MD  01/18/24 0438

## 2024-01-19 LAB — BACTERIA SPEC AEROBE CULT: NO GROWTH

## 2024-01-23 LAB
BACTERIA SPEC AEROBE CULT: NORMAL
BACTERIA SPEC AEROBE CULT: NORMAL

## 2024-12-17 ENCOUNTER — OFFICE VISIT (OUTPATIENT)
Dept: GASTROENTEROLOGY | Facility: CLINIC | Age: 79
End: 2024-12-17
Payer: MEDICARE

## 2024-12-17 VITALS
SYSTOLIC BLOOD PRESSURE: 148 MMHG | HEART RATE: 86 BPM | HEIGHT: 70 IN | WEIGHT: 294 LBS | OXYGEN SATURATION: 90 % | DIASTOLIC BLOOD PRESSURE: 86 MMHG | BODY MASS INDEX: 42.09 KG/M2

## 2024-12-17 DIAGNOSIS — K51.30 ULCERATIVE RECTOSIGMOIDITIS WITHOUT COMPLICATION: Primary | ICD-10-CM

## 2024-12-17 RX ORDER — SULFASALAZINE 500 MG/1
1000 TABLET ORAL 2 TIMES DAILY
Qty: 120 TABLET | Refills: 11 | Status: SHIPPED | OUTPATIENT
Start: 2024-12-17

## 2024-12-17 NOTE — PROGRESS NOTES
Chief Complaint    Aston Vergara is a 79 y.o. male who presents to [facility] for turns in follow-up of ulcerative colitis to 25 cm.  The patient's last colonoscopy confirmed the colitis from 0 to 25 cm and this was done in 2020.  He has done quite well currently taking sulfasalazine 2 pills p.o. twice daily.  He denies any abdominal pain, nausea vomiting fevers or chills.  He denies any ongoing rectal bleeding.  He occasionally does take a stool softener if the stools become too hard but this is not often.    Result Review :     The following data was reviewed by: Meir Carrillo MD on 12/17/2024:    CMP          1/18/2024    00:23   CMP   Glucose 157    BUN 18    Creatinine 1.02    EGFR 74.8    Sodium 136    Potassium 4.1    Chloride 97    Calcium 9.5    Total Protein 7.8    Albumin 4.5    Globulin 3.3    Total Bilirubin 0.3    Alkaline Phosphatase 75    AST (SGOT) 20    ALT (SGPT) 28    Albumin/Globulin Ratio 1.4    BUN/Creatinine Ratio 17.6    Anion Gap 14.5      CBC          1/18/2024    00:23   CBC   WBC 5.64    RBC 4.68    Hemoglobin 14.4    Hematocrit 42.6    MCV 91.0    MCH 30.8    MCHC 33.8    RDW 14.2    Platelets 147        Data reviewed : GI studies colonoscopy from 2020 reviewed      Past Medical History:   Diagnosis Date    Acid reflux     Allergic rhinitis     Arthritis     Diabetes     High blood pressure     History of knee joint replacement 12/18/2018    right    Hypertension     Sleep apnea     Ulcerative colitis        Past Surgical History:   Procedure Laterality Date    COLONOSCOPY  2020    ENDOSCOPY  2017    INTRAOCULAR LENS INSERTION      JOINT REPLACEMENT      KNEE SURGERY      both knee replaced    OTHER SURGICAL HISTORY      artificial joints/limbs       Social History     Social History Narrative    Not on file       Objective     Vital Signs:   /86 (BP Location: Right arm, Patient Position: Sitting, Cuff Size: Adult)   Pulse 86   Ht 177.8  "cm (70\")   Wt 133 kg (294 lb)   SpO2 90%   BMI 42.18 kg/m²     Body mass index is 42.18 kg/m².    Physical Exam            Assessment and Plan    Diagnoses and all orders for this visit:    1. Ulcerative rectosigmoiditis without complication (Primary)    Other orders  -     sulfaSALAzine (AZULFIDINE) 500 MG tablet; Take 2 tablets by mouth 2 (Two) Times a Day.  Dispense: 120 tablet; Refill: 11      I will refill the patient's sulfasalazine 1000 mg p.o. twice daily as it seems to control his symptoms quite well.  We have discussed the possibility of repeating his colonoscopy now and they wish to defer which I think is reasonable given his age and comorbidities.  If he starts having more symptoms then we can reconsider colonoscopy at that time.  Otherwise he will follow-up with us again in 1 year and I will refill his medication.            Follow Up   No follow-ups on file.  Patient was given instructions and counseling regarding his condition or for health maintenance advice. Please see specific information pulled into the AVS if appropriate.     "

## 2025-08-05 RX ORDER — SULFASALAZINE 500 MG/1
1000 TABLET ORAL EVERY 12 HOURS SCHEDULED
Qty: 120 TABLET | Refills: 11 | Status: SHIPPED | OUTPATIENT
Start: 2025-08-05